# Patient Record
Sex: MALE | Race: WHITE | Employment: FULL TIME | URBAN - METROPOLITAN AREA
[De-identification: names, ages, dates, MRNs, and addresses within clinical notes are randomized per-mention and may not be internally consistent; named-entity substitution may affect disease eponyms.]

---

## 2019-06-12 ENCOUNTER — HOSPITAL ENCOUNTER (EMERGENCY)
Age: 49
Discharge: HOME | End: 2019-06-12
Attending: EMERGENCY MEDICINE
Payer: MEDICAID

## 2019-06-12 VITALS
BODY MASS INDEX: 23.17 KG/M2 | TEMPERATURE: 98.7 F | SYSTOLIC BLOOD PRESSURE: 147 MMHG | RESPIRATION RATE: 11 BRPM | WEIGHT: 171.08 LBS | HEART RATE: 91 BPM | DIASTOLIC BLOOD PRESSURE: 101 MMHG | OXYGEN SATURATION: 98 % | HEIGHT: 72 IN

## 2019-06-12 DIAGNOSIS — R03.0 ELEVATED BLOOD PRESSURE READING: ICD-10-CM

## 2019-06-12 DIAGNOSIS — E16.2 HYPOGLYCEMIA: ICD-10-CM

## 2019-06-12 DIAGNOSIS — E10.9 TYPE 1 DIABETES MELLITUS WITHOUT COMPLICATION (HCC): ICD-10-CM

## 2019-06-12 DIAGNOSIS — K08.89 DENTALGIA: ICD-10-CM

## 2019-06-12 DIAGNOSIS — G50.1 ATYPICAL FACE PAIN: Primary | ICD-10-CM

## 2019-06-12 LAB
GLUCOSE BLD STRIP.AUTO-MCNC: 43 MG/DL (ref 65–100)
GLUCOSE BLD STRIP.AUTO-MCNC: 63 MG/DL (ref 65–100)
GLUCOSE BLD STRIP.AUTO-MCNC: 94 MG/DL (ref 65–100)
SERVICE CMNT-IMP: ABNORMAL
SERVICE CMNT-IMP: ABNORMAL
SERVICE CMNT-IMP: NORMAL

## 2019-06-12 PROCEDURE — 99284 EMERGENCY DEPT VISIT MOD MDM: CPT

## 2019-06-12 PROCEDURE — 74011250637 HC RX REV CODE- 250/637: Performed by: PHYSICIAN ASSISTANT

## 2019-06-12 PROCEDURE — 82962 GLUCOSE BLOOD TEST: CPT

## 2019-06-12 RX ORDER — OXYCODONE AND ACETAMINOPHEN 5; 325 MG/1; MG/1
1 TABLET ORAL
Status: COMPLETED | OUTPATIENT
Start: 2019-06-12 | End: 2019-06-12

## 2019-06-12 RX ORDER — CLINDAMYCIN HYDROCHLORIDE 300 MG/1
300 CAPSULE ORAL 4 TIMES DAILY
Qty: 40 CAP | Refills: 0 | Status: SHIPPED | OUTPATIENT
Start: 2019-06-12 | End: 2019-06-22

## 2019-06-12 RX ORDER — CLINDAMYCIN HYDROCHLORIDE 150 MG/1
300 CAPSULE ORAL
Status: COMPLETED | OUTPATIENT
Start: 2019-06-12 | End: 2019-06-12

## 2019-06-12 RX ORDER — OXYCODONE AND ACETAMINOPHEN 5; 325 MG/1; MG/1
1 TABLET ORAL
Qty: 12 TAB | Refills: 0 | Status: SHIPPED | OUTPATIENT
Start: 2019-06-12 | End: 2019-06-17

## 2019-06-12 RX ADMIN — CLINDAMYCIN HYDROCHLORIDE 300 MG: 150 CAPSULE ORAL at 21:07

## 2019-06-12 RX ADMIN — OXYCODONE AND ACETAMINOPHEN 1 TABLET: 5; 325 TABLET ORAL at 21:07

## 2019-06-13 NOTE — ED PROVIDER NOTES
EMERGENCY DEPARTMENT HISTORY AND PHYSICAL EXAM      Date: 6/12/2019  Patient Name: Connie Young    History of Presenting Illness     Chief Complaint   Patient presents with    Dental Pain     Ambulatory into the ED with c/o lower dental pain/loose tooth x 1 week. History Provided By: Patient and Patient's Wife    HPI: Connie Young, 50 y.o. male presents ambulatory to the ED with his wife in attendance, c/o dental pain x 1 week. Pt states \"I only have two teeth left and is broke\". Pt c/o increased pain, swelling. Pt denied drainage or bleeding. No fever/chills. He denied facial swelling, difficulty swallowing/breathing. No rash/lesion. He is currently without a dentist/dental insurance. He is a type I diabetic and states he felt lightheaded/clammy like his blood sugar was dropping while in the ED. POC glucose assessed: very low. Pt states he has been eating/drinking well recently. No N/V/D. Pt reportedly quit tobacco 3 years ago. Chief Complaint: dental pain, hypoglycemia  Duration: 1 Weeks  Timing:  Acute  Location: L lower dentition/gingiva  Quality: Aching and Dull  Severity: Severe  Modifying Factors: type I diabetic  Associated Symptoms: denies any other associated signs or symptoms        There are no other complaints, changes, or physical findings at this time. PCP: None    Current Outpatient Medications   Medication Sig Dispense Refill    clindamycin (CLEOCIN) 300 mg capsule Take 1 Cap by mouth four (4) times daily for 10 days. 40 Cap 0    oxyCODONE-acetaminophen (PERCOCET) 5-325 mg per tablet Take 1 Tab by mouth every eight (8) hours as needed for Pain for up to 5 days. Max Daily Amount: 3 Tabs. 12 Tab 0       Past History     Past Medical History:  History reviewed. No pertinent past medical history. Past Surgical History:  History reviewed. No pertinent surgical history. Family History:  History reviewed. No pertinent family history.     Social History:  Social History Tobacco Use    Smoking status: Not on file   Substance Use Topics    Alcohol use: Not on file    Drug use: Not on file       Allergies:  No Known Allergies      Review of Systems   Review of Systems   Constitutional: Negative for chills and fever. HENT: Positive for dental problem. Negative for congestion, drooling, rhinorrhea, sore throat and trouble swallowing. Eyes: Negative for discharge, redness and itching. Respiratory: Negative for cough and shortness of breath. Cardiovascular: Negative for chest pain and palpitations. Gastrointestinal: Negative for diarrhea, nausea and vomiting. Endocrine: Positive for cold intolerance. Negative for polydipsia, polyphagia and polyuria. Genitourinary: Negative for dysuria and hematuria. Musculoskeletal: Negative for neck pain and neck stiffness. Skin: Negative for rash and wound. Allergic/Immunologic: Negative for food allergies and immunocompromised state. Neurological: Negative for dizziness and headaches. Hematological: Negative for adenopathy. Does not bruise/bleed easily. Psychiatric/Behavioral: Negative for agitation and confusion. Physical Exam   Physical Exam   Constitutional: He is oriented to person, place, and time. He appears well-developed and well-nourished. No distress. WDWN male, alert, in NAD   HENT:   Head: Normocephalic and atraumatic. Nose: Nose normal.   Mouth/Throat: No oropharyngeal exudate. Pt with poor overall dental health, multiple dental caries, decayed/missing teeth. Tender to L lower incisor which was notably decayed.  +gingival erythema, edema surrounding affected tooth, no fluctuance, exudate, or bleeding noted. Eyes: Pupils are equal, round, and reactive to light. Conjunctivae and EOM are normal. Right eye exhibits no discharge. Left eye exhibits no discharge. No scleral icterus. Neck: Normal range of motion. Neck supple. No JVD present. No tracheal deviation present. No thyromegaly present. Cardiovascular: Normal rate, regular rhythm and normal heart sounds. Pulmonary/Chest: Effort normal and breath sounds normal. No respiratory distress. He has no wheezes. Musculoskeletal: Normal range of motion. He exhibits no edema. Lymphadenopathy:     He has no cervical adenopathy. Neurological: He is alert and oriented to person, place, and time. He exhibits normal muscle tone. Coordination normal.   Skin: Skin is warm and dry. No rash noted. He is not diaphoretic. No erythema. Psychiatric: He has a normal mood and affect. His behavior is normal. Judgment normal.   Nursing note and vitals reviewed. Diagnostic Study Results     Labs -     Recent Results (from the past 12 hour(s))   GLUCOSE, POC    Collection Time: 06/12/19  8:27 PM   Result Value Ref Range    Glucose (POC) 43 (LL) 65 - 100 mg/dL    Performed by Babs Deshpande (EDT)    GLUCOSE, POC    Collection Time: 06/12/19  8:46 PM   Result Value Ref Range    Glucose (POC) 63 (L) 65 - 100 mg/dL    Performed by Sharif Wharton, POC    Collection Time: 06/12/19  9:04 PM   Result Value Ref Range    Glucose (POC) 94 65 - 100 mg/dL    Performed by Cecilia Ruiz        Radiologic Studies -   No orders to display         Medical Decision Making   I am the first provider for this patient. I reviewed the vital signs, available nursing notes, past medical history, past surgical history, family history and social history. Vital Signs-Reviewed the patient's vital signs. Patient Vitals for the past 12 hrs:   Temp Pulse Resp BP SpO2   06/12/19 1917 98.7 °F (37.1 °C) 91 11 (!) 147/101 98 %         Records Reviewed: Nursing Notes, Old Medical Records, Previous Radiology Studies and Previous Laboratory Studies    Provider Notes (Medical Decision Making):   Dental caries, dental abscess    ED Course:   Initial assessment performed.  The patients presenting problems have been discussed, and they are in agreement with the care plan formulated and outlined with them. I have encouraged them to ask questions as they arise throughout their visit. HTN COUNSELING  Reviewed the risks of uncontrolled HTN to include stroke, heart attack, renal failure, aneurysm and death. They will take their medications daily and follow up with their PCP for recheck. DISCHARGE NOTE:  The care plan has been outline with the patient and/or family, who verbally conveyed understanding and agreement. Available results have been reviewed. Patient and/or family understand the follow up plan as outlined and discharge instructions. Should their condition deterioration at any time after discharge the patient agrees to return, follow up sooner than outlined or seek medical assistance at the closest Emergency Room as soon as possible. Questions have been answered. Discharge instructions and educational information regarding the patient's diagnosis as well a list of reasons why the patient would want to seek immediate medical attention, should their condition change, were reviewed directly with the patient/family       PLAN:  1. Discharge Medication List as of 6/12/2019  8:59 PM      START taking these medications    Details   clindamycin (CLEOCIN) 300 mg capsule Take 1 Cap by mouth four (4) times daily for 10 days. , Print, Disp-40 Cap, R-0      oxyCODONE-acetaminophen (PERCOCET) 5-325 mg per tablet Take 1 Tab by mouth every eight (8) hours as needed for Pain for up to 5 days. Max Daily Amount: 3 Tabs., Print, Disp-12 Tab, R-0           2. Follow-up Information     Follow up With Specialties Details Why [address]    [address] [address]  [phone]    [facility] EMERGENCY DEPT Emergency Medicine  If symptoms worsen [address] [place] [address]  [phone]        Return to ED if worse     Diagnosis     Clinical Impression:   1. Atypical face pain    2. Dentalgia    3. Hypoglycemia    4.  Type 1 diabetes mellitus without complication (HCC)    5. Elevated blood pressure reading

## 2019-06-13 NOTE — DISCHARGE INSTRUCTIONS
Rest, watch sugar closely. Complete all antibiotics as prescribed and follow up with dentist at your earliest opportunity. Follow up with primary care for recheck of blood pressure. Return to the Emergency Dept for any worsening pain, redness, swelling, drainage,fever, or difficulty swallowing/breathing. Patient Education        Elevated Blood Pressure: Care Instructions  Your Care Instructions    Blood pressure is a measure of how hard the blood pushes against the walls of your arteries. It's normal for blood pressure to go up and down throughout the day. But if it stays up over time, you have high blood pressure. Two numbers tell you your blood pressure. The first number is the systolic pressure. It shows how hard the blood pushes when your heart is pumping. The second number is the diastolic pressure. It shows how hard the blood pushes between heartbeats, when your heart is relaxed and filling with blood. An ideal blood pressure in adults is less than 120/80 (say \"120 over 80\"). High blood pressure is 140/90 or higher. You have high blood pressure if your top number is 140 or higher or your bottom number is 90 or higher, or both. The main test for high blood pressure is simple, fast, and painless. To diagnose high blood pressure, your doctor will test your blood pressure at different times. After testing your blood pressure, your doctor may ask you to test it again when you are home. If you are diagnosed with high blood pressure, you can work with your doctor to make a long-term plan to manage it. Follow-up care is a key part of your treatment and safety. Be sure to make and go to all appointments, and call your doctor if you are having problems. It's also a good idea to know your test results and keep a list of the medicines you take. How can you care for yourself at home? · Do not smoke. Smoking increases your risk for heart attack and stroke.  If you need help quitting, talk to your doctor about stop-smoking programs and medicines. These can increase your chances of quitting for good. · Stay at a healthy weight. · Try to limit how much sodium you eat to less than 2,300 milligrams (mg) a day. Your doctor may ask you to try to eat less than 1,500 mg a day. · Be physically active. Get at least 30 minutes of exercise on most days of the week. Walking is a good choice. You also may want to do other activities, such as running, swimming, cycling, or playing tennis or team sports. · Avoid or limit alcohol. Talk to your doctor about whether you can drink any alcohol. · Eat plenty of fruits, vegetables, and low-fat dairy products. Eat less saturated and total fats. · Learn how to check your blood pressure at home. When should you call for help? Call your doctor now or seek immediate medical care if:  ? · Your blood pressure is much higher than normal (such as 180/110 or higher). ? · You think high blood pressure is causing symptoms such as:  ¨ Severe headache. ¨ Blurry vision. ? Watch closely for changes in your health, and be sure to contact your doctor if:  ? · You do not get better as expected. Where can you learn more? Go to http://leticia-zurdo.info/. Enter X988 in the search box to learn more about \"Elevated Blood Pressure: Care Instructions. \"  Current as of: September 21, 2016  Content Version: 11.4  © 8863-6756 [employer]. Care instructions adapted under license by [employer] (which disclaims liability or warranty for this information). If you have questions about a medical condition or this instruction, always ask your healthcare professional. [place] [address] any warranty or liability for your use of this information.

## 2019-06-13 NOTE — ED NOTES
HYPOGLYCEMIC EPISODE DOCUMENTATION    Patient with hypoglycemic episode(s) at 2027(time) on 6/12/19(date). BG value(s) pre-treatment 37    Was patient symptomatic?  [x] yes, [] no  Patient was treated with the following rescue medications/treatments: [] D50                [] Glucose tablets                [] Glucagon                [x] 4oz juice                [] 6oz reg soda                [] 8oz low fat milk  BG value post-treatment: 63  Once BG treated and value greater than 80mg/dl, pt was provided with the following:  [] snack  [x] meal  Name of MD notified: SHAILA Diallo  The following orders were received: none

## 2019-06-16 ENCOUNTER — HOSPITAL ENCOUNTER (EMERGENCY)
Age: 49
Discharge: HOME | End: 2019-06-17
Attending: EMERGENCY MEDICINE
Payer: MEDICAID

## 2019-06-16 ENCOUNTER — APPOINTMENT (OUTPATIENT)
Dept: GENERAL RADIOLOGY | Age: 49
End: 2019-06-16
Attending: EMERGENCY MEDICINE
Payer: MEDICAID

## 2019-06-16 DIAGNOSIS — K08.89 DENTALGIA: ICD-10-CM

## 2019-06-16 DIAGNOSIS — R10.13 ABDOMINAL PAIN, EPIGASTRIC: Primary | ICD-10-CM

## 2019-06-16 DIAGNOSIS — K82.9 GALLBLADDER DISEASE: ICD-10-CM

## 2019-06-16 LAB
ALBUMIN SERPL-MCNC: 3.5 G/DL (ref 3.5–5)
ALBUMIN/GLOB SERPL: 0.9 {RATIO} (ref 1.1–2.2)
ALP SERPL-CCNC: 104 U/L (ref 45–117)
ALT SERPL-CCNC: 23 U/L (ref 12–78)
ANION GAP SERPL CALC-SCNC: 4 MMOL/L (ref 5–15)
AST SERPL-CCNC: 16 U/L (ref 15–37)
BASOPHILS # BLD: 0.1 K/UL (ref 0–0.1)
BASOPHILS NFR BLD: 1 % (ref 0–1)
BILIRUB SERPL-MCNC: 0.2 MG/DL (ref 0.2–1)
BUN SERPL-MCNC: 19 MG/DL (ref 6–20)
BUN/CREAT SERPL: 18 (ref 12–20)
CALCIUM SERPL-MCNC: 9 MG/DL (ref 8.5–10.1)
CHLORIDE SERPL-SCNC: 106 MMOL/L (ref 97–108)
CK SERPL-CCNC: 110 U/L (ref 39–308)
CO2 SERPL-SCNC: 31 MMOL/L (ref 21–32)
CREAT SERPL-MCNC: 1.04 MG/DL (ref 0.7–1.3)
DIFFERENTIAL METHOD BLD: NORMAL
EOSINOPHIL # BLD: 0.3 K/UL (ref 0–0.4)
EOSINOPHIL NFR BLD: 4 % (ref 0–7)
ERYTHROCYTE [DISTWIDTH] IN BLOOD BY AUTOMATED COUNT: 13.2 % (ref 11.5–14.5)
GLOBULIN SER CALC-MCNC: 3.7 G/DL (ref 2–4)
GLUCOSE SERPL-MCNC: 115 MG/DL (ref 65–100)
HCT VFR BLD AUTO: 37.7 % (ref 36.6–50.3)
HGB BLD-MCNC: 12.9 G/DL (ref 12.1–17)
IMM GRANULOCYTES # BLD AUTO: 0 K/UL (ref 0–0.04)
IMM GRANULOCYTES NFR BLD AUTO: 0 % (ref 0–0.5)
LYMPHOCYTES # BLD: 2.3 K/UL (ref 0.8–3.5)
LYMPHOCYTES NFR BLD: 26 % (ref 12–49)
MCH RBC QN AUTO: 27.4 PG (ref 26–34)
MCHC RBC AUTO-ENTMCNC: 34.2 G/DL (ref 30–36.5)
MCV RBC AUTO: 80 FL (ref 80–99)
MONOCYTES # BLD: 0.6 K/UL (ref 0–1)
MONOCYTES NFR BLD: 7 % (ref 5–13)
NEUTS SEG # BLD: 5.4 K/UL (ref 1.8–8)
NEUTS SEG NFR BLD: 62 % (ref 32–75)
NRBC # BLD: 0 K/UL (ref 0–0.01)
NRBC BLD-RTO: 0 PER 100 WBC
PLATELET # BLD AUTO: 245 K/UL (ref 150–400)
PMV BLD AUTO: 9.1 FL (ref 8.9–12.9)
POTASSIUM SERPL-SCNC: 3.7 MMOL/L (ref 3.5–5.1)
PROT SERPL-MCNC: 7.2 G/DL (ref 6.4–8.2)
RBC # BLD AUTO: 4.71 M/UL (ref 4.1–5.7)
SODIUM SERPL-SCNC: 141 MMOL/L (ref 136–145)
TROPONIN I SERPL-MCNC: <0.05 NG/ML
WBC # BLD AUTO: 8.7 K/UL (ref 4.1–11.1)

## 2019-06-16 PROCEDURE — 84484 ASSAY OF TROPONIN QUANT: CPT

## 2019-06-16 PROCEDURE — 71045 X-RAY EXAM CHEST 1 VIEW: CPT

## 2019-06-16 PROCEDURE — 82550 ASSAY OF CK (CPK): CPT

## 2019-06-16 PROCEDURE — 74011250636 HC RX REV CODE- 250/636: Performed by: EMERGENCY MEDICINE

## 2019-06-16 PROCEDURE — 36415 COLL VENOUS BLD VENIPUNCTURE: CPT

## 2019-06-16 PROCEDURE — 80053 COMPREHEN METABOLIC PANEL: CPT

## 2019-06-16 PROCEDURE — 93005 ELECTROCARDIOGRAM TRACING: CPT

## 2019-06-16 PROCEDURE — 83690 ASSAY OF LIPASE: CPT

## 2019-06-16 PROCEDURE — 99284 EMERGENCY DEPT VISIT MOD MDM: CPT

## 2019-06-16 PROCEDURE — 85025 COMPLETE CBC W/AUTO DIFF WBC: CPT

## 2019-06-16 RX ORDER — FENTANYL CITRATE 50 UG/ML
50 INJECTION, SOLUTION INTRAMUSCULAR; INTRAVENOUS
Status: COMPLETED | OUTPATIENT
Start: 2019-06-16 | End: 2019-06-17

## 2019-06-16 RX ORDER — FAMOTIDINE 10 MG/ML
20 INJECTION INTRAVENOUS
Status: DISCONTINUED | OUTPATIENT
Start: 2019-06-16 | End: 2019-06-16

## 2019-06-16 RX ORDER — ONDANSETRON 2 MG/ML
4 INJECTION INTRAMUSCULAR; INTRAVENOUS
Status: COMPLETED | OUTPATIENT
Start: 2019-06-16 | End: 2019-06-17

## 2019-06-17 ENCOUNTER — APPOINTMENT (OUTPATIENT)
Dept: ULTRASOUND IMAGING | Age: 49
End: 2019-06-17
Attending: EMERGENCY MEDICINE
Payer: MEDICAID

## 2019-06-17 VITALS
TEMPERATURE: 98.2 F | SYSTOLIC BLOOD PRESSURE: 132 MMHG | HEIGHT: 72 IN | HEART RATE: 71 BPM | BODY MASS INDEX: 23.71 KG/M2 | OXYGEN SATURATION: 98 % | WEIGHT: 175.04 LBS | RESPIRATION RATE: 17 BRPM | DIASTOLIC BLOOD PRESSURE: 99 MMHG

## 2019-06-17 LAB
ATRIAL RATE: 86 BPM
CALCULATED P AXIS, ECG09: 43 DEGREES
CALCULATED R AXIS, ECG10: -22 DEGREES
CALCULATED T AXIS, ECG11: 35 DEGREES
DIAGNOSIS, 93000: NORMAL
LACTATE SERPL-SCNC: 0.7 MMOL/L (ref 0.4–2)
LIPASE SERPL-CCNC: 55 U/L (ref 73–393)
P-R INTERVAL, ECG05: 178 MS
Q-T INTERVAL, ECG07: 370 MS
QRS DURATION, ECG06: 102 MS
QTC CALCULATION (BEZET), ECG08: 442 MS
VENTRICULAR RATE, ECG03: 86 BPM

## 2019-06-17 PROCEDURE — 76705 ECHO EXAM OF ABDOMEN: CPT

## 2019-06-17 PROCEDURE — 74011250637 HC RX REV CODE- 250/637: Performed by: EMERGENCY MEDICINE

## 2019-06-17 PROCEDURE — 96361 HYDRATE IV INFUSION ADD-ON: CPT

## 2019-06-17 PROCEDURE — 96376 TX/PRO/DX INJ SAME DRUG ADON: CPT

## 2019-06-17 PROCEDURE — 96374 THER/PROPH/DIAG INJ IV PUSH: CPT

## 2019-06-17 PROCEDURE — 74011000250 HC RX REV CODE- 250: Performed by: EMERGENCY MEDICINE

## 2019-06-17 PROCEDURE — 74011250636 HC RX REV CODE- 250/636

## 2019-06-17 PROCEDURE — 74011250636 HC RX REV CODE- 250/636: Performed by: EMERGENCY MEDICINE

## 2019-06-17 PROCEDURE — 83605 ASSAY OF LACTIC ACID: CPT

## 2019-06-17 PROCEDURE — 96375 TX/PRO/DX INJ NEW DRUG ADDON: CPT

## 2019-06-17 RX ORDER — FENTANYL CITRATE 50 UG/ML
25 INJECTION, SOLUTION INTRAMUSCULAR; INTRAVENOUS
Status: COMPLETED | OUTPATIENT
Start: 2019-06-17 | End: 2019-06-17

## 2019-06-17 RX ORDER — OXYCODONE AND ACETAMINOPHEN 5; 325 MG/1; MG/1
1 TABLET ORAL
Qty: 10 TAB | Refills: 0 | Status: SHIPPED | OUTPATIENT
Start: 2019-06-17 | End: 2019-06-20

## 2019-06-17 RX ORDER — FENTANYL CITRATE 50 UG/ML
INJECTION, SOLUTION INTRAMUSCULAR; INTRAVENOUS
Status: DISCONTINUED
Start: 2019-06-17 | End: 2019-06-17 | Stop reason: HOSPADM

## 2019-06-17 RX ORDER — ONDANSETRON 4 MG/1
4 TABLET, ORALLY DISINTEGRATING ORAL
Qty: 10 TAB | Refills: 0 | Status: SHIPPED | OUTPATIENT
Start: 2019-06-17

## 2019-06-17 RX ADMIN — LIDOCAINE HYDROCHLORIDE 40 ML: 20 SOLUTION ORAL; TOPICAL at 00:31

## 2019-06-17 RX ADMIN — FENTANYL CITRATE 25 MCG: 50 INJECTION, SOLUTION INTRAMUSCULAR; INTRAVENOUS at 01:54

## 2019-06-17 RX ADMIN — FENTANYL CITRATE 50 MCG: 50 INJECTION, SOLUTION INTRAMUSCULAR; INTRAVENOUS at 00:02

## 2019-06-17 RX ADMIN — ONDANSETRON 4 MG: 2 INJECTION INTRAMUSCULAR; INTRAVENOUS at 00:03

## 2019-06-17 RX ADMIN — FAMOTIDINE 20 MG: 10 INJECTION, SOLUTION INTRAVENOUS at 00:03

## 2019-06-17 RX ADMIN — SODIUM CHLORIDE 1000 ML: 900 INJECTION, SOLUTION INTRAVENOUS at 00:02

## 2019-06-17 NOTE — ED PROVIDER NOTES
EMERGENCY DEPARTMENT HISTORY AND PHYSICAL EXAM      Date: 6/16/2019  Patient Name: Vita Pereyra    History of Presenting Illness     Chief Complaint   Patient presents with    Chest Pain     Since midnight last night. Pt states it feels like horrible heart burn. Pt denies any cardiac hx.  reports vomiting yesterday. Pt ambulatory to triage.  Back Pain     started shortly after CP. Hx of fx to same area. History Provided By: Patient    HPI: Vita Pereyra, 50 y.o. male presents to the emergency room with chief complaint of epigastric pain for the past 24 hours. Patient states the pain is severe and burning and nonradiating. He thought it was heartburn and tried Prilosec, Rolaids, and Tums without relief. He had nausea and vomiting x1 that did not have any blood in it. He has had 6 episodes of diarrhea that was nonblack and nonbloody. He reports chills but no fevers. He was recently treated with clindamycin and Percocet for dental infection and has been on antibiotics for the past 4 days. He is also been taking ibuprofen. He denies alcohol use. He is a type I diabetic. PCP: None    No current facility-administered medications on file prior to encounter. Current Outpatient Medications on File Prior to Encounter   Medication Sig Dispense Refill    clindamycin (CLEOCIN) 300 mg capsule Take 1 Cap by mouth four (4) times daily for 10 days. 40 Cap 0    oxyCODONE-acetaminophen (PERCOCET) 5-325 mg per tablet Take 1 Tab by mouth every eight (8) hours as needed for Pain for up to 5 days. Max Daily Amount: 3 Tabs. 12 Tab 0       Past History     Past Medical History:  No past medical history on file. Past Surgical History:  No past surgical history on file. Family History:  No family history on file.     Social History:  Social History     Tobacco Use    Smoking status: Not on file   Substance Use Topics    Alcohol use: Not on file    Drug use: Not on file       Allergies:  No Known Allergies      Review of Systems   Review of Systems   Constitutional: Negative for chills and fever. HENT: Negative for congestion, ear pain, sinus pain and sore throat. Eyes: Negative. Respiratory: Negative for cough, chest tightness and shortness of breath. Cardiovascular: Negative for chest pain, palpitations and leg swelling. Gastrointestinal: Positive for abdominal pain, diarrhea, nausea and vomiting. Negative for blood in stool and constipation. Genitourinary: Negative for decreased urine volume, dysuria, flank pain and hematuria. Musculoskeletal: Negative for back pain, joint swelling, myalgias and neck pain. Skin: Negative for rash. Neurological: Negative for syncope, speech difficulty, weakness and light-headedness. Psychiatric/Behavioral: The patient is not nervous/anxious.           Physical Exam    General appearance - well nourished, well appearing, and in no distress  Eyes - pupils equal and reactive, extraocular eye movements intact  ENT - mucous membranes moist, pharynx normal without lesions  Neck - supple, no significant adenopathy; non-tender to palpation  Chest - clear to auscultation, no wheezes, rales or rhonchi; non-tender to palpation  Heart - normal rate and regular rhythm, S1 and S2 normal, no murmurs noted  Abdomen - soft, epigastric tenderness, no rebound or guarding, nondistended, no masses or organomegaly  Musculoskeletal - no joint tenderness, deformity or swelling; normal ROM  Extremities - peripheral pulses normal, no pedal edema  Skin - normal coloration and turgor, no rashes  Neurological - alert, oriented x3, normal speech, no focal findings or movement disorder noted    Diagnostic Study Results     Labs -     Recent Results (from the past 12 hour(s))   EKG, 12 LEAD, INITIAL    Collection Time: 06/16/19 10:34 PM   Result Value Ref Range    Ventricular Rate 86 BPM    Atrial Rate 86 BPM    P-R Interval 178 ms    QRS Duration 102 ms    Q-T Interval 370 ms QTC Calculation (Bezet) 442 ms    Calculated P Axis 43 degrees    Calculated R Axis -22 degrees    Calculated T Axis 35 degrees    Diagnosis       Normal sinus rhythm  Normal ECG  No previous ECGs available     CBC WITH AUTOMATED DIFF    Collection Time: 06/16/19 10:56 PM   Result Value Ref Range    WBC 8.7 4.1 - 11.1 K/uL    RBC 4.71 4.10 - 5.70 M/uL    HGB 12.9 12.1 - 17.0 g/dL    HCT 37.7 36.6 - 50.3 %    MCV 80.0 80.0 - 99.0 FL    MCH 27.4 26.0 - 34.0 PG    MCHC 34.2 30.0 - 36.5 g/dL    RDW 13.2 11.5 - 14.5 %    PLATELET 983 545 - 607 K/uL    MPV 9.1 8.9 - 12.9 FL    NRBC 0.0 0  WBC    ABSOLUTE NRBC 0.00 0.00 - 0.01 K/uL    NEUTROPHILS 62 32 - 75 %    LYMPHOCYTES 26 12 - 49 %    MONOCYTES 7 5 - 13 %    EOSINOPHILS 4 0 - 7 %    BASOPHILS 1 0 - 1 %    IMMATURE GRANULOCYTES 0 0.0 - 0.5 %    ABS. NEUTROPHILS 5.4 1.8 - 8.0 K/UL    ABS. LYMPHOCYTES 2.3 0.8 - 3.5 K/UL    ABS. MONOCYTES 0.6 0.0 - 1.0 K/UL    ABS. EOSINOPHILS 0.3 0.0 - 0.4 K/UL    ABS. BASOPHILS 0.1 0.0 - 0.1 K/UL    ABS. IMM. GRANS. 0.0 0.00 - 0.04 K/UL    DF AUTOMATED     METABOLIC PANEL, COMPREHENSIVE    Collection Time: 06/16/19 10:56 PM   Result Value Ref Range    Sodium 141 136 - 145 mmol/L    Potassium 3.7 3.5 - 5.1 mmol/L    Chloride 106 97 - 108 mmol/L    CO2 31 21 - 32 mmol/L    Anion gap 4 (L) 5 - 15 mmol/L    Glucose 115 (H) 65 - 100 mg/dL    BUN 19 6 - 20 MG/DL    Creatinine 1.04 0.70 - 1.30 MG/DL    BUN/Creatinine ratio 18 12 - 20      GFR est AA >60 >60 ml/min/1.73m2    GFR est non-AA >60 >60 ml/min/1.73m2    Calcium 9.0 8.5 - 10.1 MG/DL    Bilirubin, total 0.2 0.2 - 1.0 MG/DL    ALT (SGPT) 23 12 - 78 U/L    AST (SGOT) 16 15 - 37 U/L    Alk.  phosphatase 104 45 - 117 U/L    Protein, total 7.2 6.4 - 8.2 g/dL    Albumin 3.5 3.5 - 5.0 g/dL    Globulin 3.7 2.0 - 4.0 g/dL    A-G Ratio 0.9 (L) 1.1 - 2.2     CK W/ REFLX CKMB    Collection Time: 06/16/19 10:56 PM   Result Value Ref Range     39 - 308 U/L   TROPONIN I    Collection Time: 06/16/19 10:56 PM   Result Value Ref Range    Troponin-I, Qt. <0.05 <0.05 ng/mL       Radiologic Studies -   XR CHEST PORT   Final Result   impression: No acute changes. CT Results  (Last 48 hours)    None        CXR Results  (Last 48 hours)               06/16/19 2245  XR CHEST PORT Final result    Impression:  impression: No acute changes. Narrative:  Clinical indication: Chest pain. Portable AP upright view of the chest is obtained. No prior. There is elevation   of the left hemidiaphragm. The heart size is normal. There is no acute   infiltrate. Medical Decision Making   I am the first provider for this patient. I reviewed the vital signs, available nursing notes, past medical history, past surgical history, family history and social history. Vital Signs-Reviewed the patient's vital signs. Patient Vitals for the past 12 hrs:   Temp Pulse Resp BP SpO2   06/16/19 2330 -- 77 16 149/79 98 %   06/16/19 2300 -- 81 15 121/86 97 %   06/16/19 2228 98.2 °F (36.8 °C) 84 18 128/74 100 %       EKG: Normal sinus rhythm, 86 bpm, normal axis, normal NE, QRS, QTc intervals, nonspecific ST changes    Records Reviewed: Nursing Notes and Old Medical Records    Provider Notes (Medical Decision Making):   Differential diagnosis: Gastritis, pancreatitis, cholecystitis, acute coronary syndrome, GERD  ED Course:   Initial assessment performed. The patients presenting problems have been discussed, and they are in agreement with the care plan formulated and outlined with them. I have encouraged them to ask questions as they arise throughout their visit. Progress Notes:     Patient feeling better in the emergency room. Tolerating p.o. Disposition:  ME home    PLAN:  1. Discharge Medication List as of 6/17/2019  3:52 AM      START taking these medications    Details   ondansetron (ZOFRAN ODT) 4 mg disintegrating tablet Take 1 Tab by mouth every eight (8) hours as needed for Nausea. , Print, Disp-10 Tab, R-0         CONTINUE these medications which have CHANGED    Details   oxyCODONE-acetaminophen (PERCOCET) 5-325 mg per tablet Take 1 Tab by mouth every eight (8) hours as needed for Pain for up to 3 days. Max Daily Amount: 3 Tabs., Print, Disp-10 Tab, R-0         CONTINUE these medications which have NOT CHANGED    Details   clindamycin (CLEOCIN) 300 mg capsule Take 1 Cap by mouth four (4) times daily for 10 days. , Print, Disp-40 Cap, R-0           2. Follow-up Information     Follow up With Specialties Details Why Contact Info    [facility] EMERGENCY DEPT Emergency Medicine  If symptoms worsen [address] Shlomo Ramsey MD General Surgery, Breast Surgery, Colon and Rectal Surgery, Endocrinology Schedule an appointment as soon as possible for a visit  [address] [place] [address] [address]  [phone]          Return to ED if worse     Diagnosis     Clinical Impression:   1. Abdominal pain, epigastric    2. Gallbladder disease    3.  Ranjoellee Cogan

## 2019-06-17 NOTE — ED NOTES
Assumed care of patient from triage. Patient endorses indigestion/heartburn chest discomfort beginning around midnight last night. Patient states 1 episode of vomiting in the evening before this. Patient endorses associated SOB. Patient a/o x 4.

## 2019-06-17 NOTE — ED NOTES
Patient discharged by Dr. Victor Hugo Montgomery. Patient ambulated with steady gait in NAD accompanied by spouse on departure.

## 2019-06-17 NOTE — DISCHARGE INSTRUCTIONS
Patient Education        Abdominal Pain: Care Instructions  Your Care Instructions    Abdominal pain has many possible causes. Some aren't serious and get better on their own in a few days. Others need more testing and treatment. If your pain continues or gets worse, you need to be rechecked and may need more tests to find out what is wrong. You may need surgery to correct the problem. Don't ignore new symptoms, such as fever, nausea and vomiting, urination problems, pain that gets worse, and dizziness. These may be signs of a more serious problem. Your doctor may have recommended a follow-up visit in the next 8 to 12 hours. If you are not getting better, you may need more tests or treatment. The doctor has checked you carefully, but problems can develop later. If you notice any problems or new symptoms, get medical treatment right away. Follow-up care is a key part of your treatment and safety. Be sure to make and go to all appointments, and call your doctor if you are having problems. It's also a good idea to know your test results and keep a list of the medicines you take. How can you care for yourself at home? · Rest until you feel better. · To prevent dehydration, drink plenty of fluids, enough so that your urine is light yellow or clear like water. Choose water and other caffeine-free clear liquids until you feel better. If you have kidney, heart, or liver disease and have to limit fluids, talk with your doctor before you increase the amount of fluids you drink. · If your stomach is upset, eat mild foods, such as rice, dry toast or crackers, bananas, and applesauce. Try eating several small meals instead of two or three large ones. · Wait until 48 hours after all symptoms have gone away before you have spicy foods, alcohol, and drinks that contain caffeine. · Do not eat foods that are high in fat. · Avoid anti-inflammatory medicines such as aspirin, ibuprofen (Advil, Motrin), and naproxen (Aleve). These can cause stomach upset. Talk to your doctor if you take daily aspirin for another health problem. When should you call for help? Call 911 anytime you think you may need emergency care. For example, call if:    · You passed out (lost consciousness).     · You pass maroon or very bloody stools.     · You vomit blood or what looks like coffee grounds.     · You have new, severe belly pain.    Call your doctor now or seek immediate medical care if:    · Your pain gets worse, especially if it becomes focused in one area of your belly.     · You have a new or higher fever.     · Your stools are black and look like tar, or they have streaks of blood.     · You have unexpected vaginal bleeding.     · You have symptoms of a urinary tract infection. These may include:  ? Pain when you urinate. ? Urinating more often than usual.  ? Blood in your urine.     · You are dizzy or lightheaded, or you feel like you may faint.    Watch closely for changes in your health, and be sure to contact your doctor if:    · You are not getting better after 1 day (24 hours). Where can you learn more? Go to http://leticiaMeal Mantrazurdo.info/. Enter N223 in the search box to learn more about \"Abdominal Pain: Care Instructions. \"  Current as of: September 23, 2018  Content Version: 11.9  © 6502-7807 [employer] Care instructions adapted under license by [employer] (which disclaims liability or warranty for this information). If you have questions about a medical condition or this instruction, always ask your healthcare professional. [place] [address] any warranty or liability for your use of this information. Patient Education        Low-Fat Diet for Gallbladder Disease: Care Instructions  Your Care Instructions    When you eat, the gallbladder releases bile, which helps you digest the fat in food. If you have an inflamed gallbladder, this may cause pain.  A low-fat diet may give your gallbladder a rest so you can start to heal. Your doctor and dietitian can help you make an eating plan that does not irritate your digestive system. Always talk with your doctor or dietitian before you make changes in your diet. Follow-up care is a key part of your treatment and safety. Be sure to make and go to all appointments, and call your doctor if you are having problems. It's also a good idea to know your test results and keep a list of the medicines you take. How can you care for yourself at home? · Eat many small meals and snacks each day instead of three large meals. · Choose lean meats. ? Eat no more than 5 to 6½ ounces of meat a day. ? Cut off all fat you can see. ? Eat chicken and turkey without the skin. ? Many types of fish, such as salmon, lake trout, tuna, and herring, provide healthy omega-3 fat. But, avoid fish canned in oil, such as sardines in olive oil. ? Bake, broil, or grill meats, poultry, or fish instead of frying them in butter or fat. · Drink or eat nonfat or low-fat milk, yogurt, cheese, or other milk products each day. ? Read the labels on cheeses, and choose those with less than 5 grams of fat an ounce. ? Try fat-free sour cream, cream cheese, or yogurt. ? Avoid cream soups and cream sauces on pasta. ? Eat low-fat ice cream, frozen yogurt, or sorbet. Avoid regular ice cream.  · Eat whole-grain cereals, breads, crackers, rice, or pasta. Avoid high-fat foods such as croissants, scones, biscuits, waffles, doughnuts, muffins, granola, and high-fat breads. · Flavor your foods with herbs and spices (such as basil, tarragon, or mint), fat-free sauces, or lemon juice instead of butter. You can also use butter substitutes, fat-free mayonnaise, or fat-free dressing. · Try applesauce, prune puree, or mashed bananas to replace some or all of the fat when you bake.   · Limit fats and oils, such as butter, margarine, mayonnaise, and salad dressing, to no more than 1 tablespoon a meal.  · Avoid high-fat foods, such as:  ? Chocolate, whole milk, ice cream, and processed cheese. ? Fried or buttered foods. ? Sausage, salami, and gil. ? Cinnamon rolls, cakes, pies, cookies, and other pastries. ? Prepared snack foods, such as potato chips, nut and granola bars, and mixed nuts. ? Coconut and avocado. · Learn how to read food labels for serving sizes and ingredients. Fast-food and convenience-food meals often have lots of fat. Where can you learn more? Go to http://leticia-zurdo.info/. Enter P783 in the search box to learn more about \"Low-Fat Diet for Gallbladder Disease: Care Instructions. \"  Current as of: March 28, 2018  Content Version: 11.9  © 1229-6025 [employer]. Care instructions adapted under license by [employer] (which disclaims liability or warranty for this information). If you have questions about a medical condition or this instruction, always ask your healthcare professional. [place] [address] any warranty or liability for your use of this information.

## 2019-08-25 ENCOUNTER — HOSPITAL ENCOUNTER (EMERGENCY)
Age: 49
Discharge: HOME | End: 2019-08-25
Attending: EMERGENCY MEDICINE
Payer: SELF-PAY

## 2019-08-25 ENCOUNTER — APPOINTMENT (OUTPATIENT)
Dept: GENERAL RADIOLOGY | Age: 49
End: 2019-08-25
Attending: PHYSICIAN ASSISTANT
Payer: SELF-PAY

## 2019-08-25 VITALS
RESPIRATION RATE: 17 BRPM | OXYGEN SATURATION: 99 % | HEIGHT: 72 IN | HEART RATE: 99 BPM | SYSTOLIC BLOOD PRESSURE: 143 MMHG | DIASTOLIC BLOOD PRESSURE: 92 MMHG | BODY MASS INDEX: 22.46 KG/M2 | WEIGHT: 165.79 LBS | TEMPERATURE: 97.9 F

## 2019-08-25 DIAGNOSIS — M79.604 RIGHT LEG PAIN: Primary | ICD-10-CM

## 2019-08-25 LAB — D DIMER PPP FEU-MCNC: 0.31 MG/L FEU (ref 0–0.65)

## 2019-08-25 PROCEDURE — 74011250637 HC RX REV CODE- 250/637: Performed by: PHYSICIAN ASSISTANT

## 2019-08-25 PROCEDURE — 99283 EMERGENCY DEPT VISIT LOW MDM: CPT

## 2019-08-25 PROCEDURE — 36415 COLL VENOUS BLD VENIPUNCTURE: CPT

## 2019-08-25 PROCEDURE — 85379 FIBRIN DEGRADATION QUANT: CPT

## 2019-08-25 PROCEDURE — 73590 X-RAY EXAM OF LOWER LEG: CPT

## 2019-08-25 RX ORDER — IBUPROFEN 600 MG/1
600 TABLET ORAL
Status: COMPLETED | OUTPATIENT
Start: 2019-08-25 | End: 2019-08-25

## 2019-08-25 RX ORDER — IBUPROFEN 600 MG/1
600 TABLET ORAL
Qty: 20 TAB | Refills: 0 | OUTPATIENT
Start: 2019-08-25 | End: 2019-10-12

## 2019-08-25 RX ADMIN — IBUPROFEN 600 MG: 600 TABLET ORAL at 14:36

## 2019-08-25 NOTE — ED PROVIDER NOTES
EMERGENCY DEPARTMENT HISTORY AND PHYSICAL EXAM      Date: 8/25/2019  Patient Name: Stephane Goldmann    History of Presenting Illness     Chief Complaint   Patient presents with    Leg Pain     right leg pain x couple days. reports getting new shoes recently. \"its like  tree is splitting it feels like\"       History Provided By: Patient    HPI: Stephane Goldmann, 52 y.o. male presents ambulatory to the ED with cc of 2 days of 10 out of 10 constant, aching right lower leg pain that is worse with palpation and movement and not associated with injury or shortness of breath. He does have IDDM and a history of diabetic neuropathy. He tells me he recently moved from [place] to [place] and is anticipating getting his insurance set up. He works as an  and recently started wearing new steel toed boots. There are no other complaints, changes, or physical findings at this time. PCP: None    Current Outpatient Medications   Medication Sig Dispense Refill    ibuprofen (MOTRIN) 600 mg tablet Take 1 Tab by mouth every eight (8) hours as needed for Pain. 20 Tab 0    ondansetron (ZOFRAN ODT) 4 mg disintegrating tablet Take 1 Tab by mouth every eight (8) hours as needed for Nausea. 10 Tab 0     Past History     Past Medical History:  Past Medical History:   Diagnosis Date    Diabetes ([address]     Neuropathy        Past Surgical History:  History reviewed. No pertinent surgical history. Family History:  History reviewed. No pertinent family history. Social History:  Social History     Tobacco Use    Smoking status: Not on file   Substance Use Topics    Alcohol use: Not on file    Drug use: Not on file       Allergies:  No Known Allergies  Review of Systems   Review of Systems   Constitutional: Negative for fatigue and fever. HENT: Negative for congestion, ear pain and rhinorrhea. Eyes: Negative for pain and redness. Respiratory: Negative for cough and wheezing.     Cardiovascular: Negative for chest pain and palpitations. Gastrointestinal: Negative for abdominal pain, nausea and vomiting. Genitourinary: Negative for dysuria, frequency and urgency. Musculoskeletal: Negative for back pain, neck pain and neck stiffness. Right lower leg pain   Skin: Negative for rash and wound. Neurological: Negative for weakness, light-headedness, numbness and headaches. Physical Exam   Physical Exam   Constitutional: He is oriented to person, place, and time. He appears well-developed and well-nourished. Non-toxic appearance. No distress. HENT:   Head: Normocephalic and atraumatic. Head is without right periorbital erythema and without left periorbital erythema. Right Ear: External ear normal.   Left Ear: External ear normal.   Nose: Nose normal.   Mouth/Throat: Uvula is midline. No trismus in the jaw. Eyes: Pupils are equal, round, and reactive to light. Conjunctivae and EOM are normal. No scleral icterus. Neck: Normal range of motion and full passive range of motion without pain. Cardiovascular: Normal rate, regular rhythm and normal heart sounds. Pulmonary/Chest: Effort normal and breath sounds normal. No accessory muscle usage. No tachypnea. No respiratory distress. He has no decreased breath sounds. He has no wheezes. Abdominal: Soft. There is no tenderness. There is no rigidity and no guarding. Musculoskeletal: Normal range of motion. Right lower leg: He exhibits tenderness and bony tenderness. RIGHT LOWER LEG:  No bruising, redness or obvious swelling  FAROM of all joints of the lower extremity (hip, knee, ankle, foot)  There is calf tenderness and mild medial midshaft tibial tenderness  There are no cordlike palpable varicosities   Neurological: He is alert and oriented to person, place, and time. He is not disoriented. No cranial nerve deficit or sensory deficit. GCS eye subscore is 4. GCS verbal subscore is 5. GCS motor subscore is 6. Skin: Skin is intact. No rash noted. Psychiatric: He has a normal mood and affect. His speech is normal.   Nursing note and vitals reviewed. Diagnostic Study Results     Labs -     Recent Results (from the past 12 hour(s))   D DIMER    Collection Time: 08/25/19  2:35 PM   Result Value Ref Range    D-dimer 0.31 0.00 - 0.65 mg/L FEU       Radiologic Studies -   XR TIB/FIB RT   Final Result   IMPRESSION: No acute abnormality. CT Results  (Last 48 hours)    None        CXR Results  (Last 48 hours)    None        Medical Decision Making   I am the first provider for this patient. I reviewed the vital signs, available nursing notes, past medical history, past surgical history, family history and social history. Vital Signs-Reviewed the patient's vital signs. Patient Vitals for the past 12 hrs:   Temp Pulse Resp BP SpO2   08/25/19 1339 97.9 °F (36.6 °C) 99 17 (!) 143/92 99 %       Pulse Oximetry Analysis - 99% on RA    Records Reviewed: Nursing Notes, Old Medical Records, Previous Radiology Studies, Previous Laboratory Studies and     Provider Notes (Medical Decision Making):   DDx: Periostitis, stress fracture, doubt DVT, strain    ED Course:   Initial assessment performed. The patients presenting problems have been discussed, and they are in agreement with the care plan formulated and outlined with them. I have encouraged them to ask questions as they arise throughout their visit. Disposition:  Discharge    PLAN:  1. Discharge Medication List as of 8/25/2019  3:18 PM      START taking these medications    Details   ibuprofen (MOTRIN) 600 mg tablet Take 1 Tab by mouth every eight (8) hours as needed for Pain., Print, Disp-20 Tab, R-0         CONTINUE these medications which have NOT CHANGED    Details   ondansetron (ZOFRAN ODT) 4 mg disintegrating tablet Take 1 Tab by mouth every eight (8) hours as needed for Nausea. , Print, Disp-10 Tab, R-0           2.    Follow-up Information     Follow up With Specialties Details Why Contact [address]  Schedule an appointment as soon as possible for a visit PRIMARY CARE: call to schedule follow up [address]  [phone]        Return to ED if worse     Diagnosis     Clinical Impression:   1.  Right leg pain

## 2019-08-25 NOTE — LETTER
[address] 
[facility] EMERGENCY DEPT 
[address] [place] [id number] [phone] Work/School Note Date: 8/25/2019 To Whom It May concern: 
 
Damián Campo was seen and treated today in the emergency room by the following provider(s): 
Attending Provider: Gabrielle Lund MD 
Physician Assistant: SHAILA Hernandes. Damián Campo may return to work on [address] [place] [address]. Sincerely, Cosimo Goltz, PA

## 2019-08-25 NOTE — ED TRIAGE NOTES
Pt. Presents to ED today for complaints of R lower leg pain that is affecting his shin and calf. Pt. Alert and oriented x4. PT. Placed in position of comfort with call bell in reach.

## 2019-08-25 NOTE — ED NOTES
Patient discharged by SHAILA Perez. Patient provided with discharge instructions Rx and instructions on follow up care. Patient out of ED ambulatory accompanied by family.

## 2019-09-15 ENCOUNTER — HOSPITAL ENCOUNTER (EMERGENCY)
Age: 49
Discharge: HOME | End: 2019-09-15
Attending: EMERGENCY MEDICINE
Payer: COMMERCIAL

## 2019-09-15 ENCOUNTER — APPOINTMENT (OUTPATIENT)
Dept: GENERAL RADIOLOGY | Age: 49
End: 2019-09-15
Attending: EMERGENCY MEDICINE
Payer: COMMERCIAL

## 2019-09-15 VITALS
DIASTOLIC BLOOD PRESSURE: 84 MMHG | HEART RATE: 99 BPM | HEIGHT: 72 IN | RESPIRATION RATE: 14 BRPM | BODY MASS INDEX: 22.99 KG/M2 | WEIGHT: 169.75 LBS | OXYGEN SATURATION: 99 % | SYSTOLIC BLOOD PRESSURE: 95 MMHG | TEMPERATURE: 98.1 F

## 2019-09-15 DIAGNOSIS — L97.509 OTHER SPECIFIED DIABETES MELLITUS WITH FOOT ULCER, UNSPECIFIED WHETHER LONG TERM INSULIN USE (HCC): ICD-10-CM

## 2019-09-15 DIAGNOSIS — L97.519 DIABETIC ULCER OF TOE OF RIGHT FOOT ASSOCIATED WITH DIABETES MELLITUS OF OTHER TYPE, UNSPECIFIED ULCER STAGE (HCC): Primary | ICD-10-CM

## 2019-09-15 DIAGNOSIS — G62.9 NEUROPATHY: ICD-10-CM

## 2019-09-15 DIAGNOSIS — E13.621 DIABETIC ULCER OF TOE OF RIGHT FOOT ASSOCIATED WITH DIABETES MELLITUS OF OTHER TYPE, UNSPECIFIED ULCER STAGE (HCC): Primary | ICD-10-CM

## 2019-09-15 DIAGNOSIS — E13.621 OTHER SPECIFIED DIABETES MELLITUS WITH FOOT ULCER, UNSPECIFIED WHETHER LONG TERM INSULIN USE (HCC): ICD-10-CM

## 2019-09-15 LAB
BASOPHILS # BLD: 0 K/UL (ref 0–0.1)
BASOPHILS NFR BLD: 1 % (ref 0–1)
DIFFERENTIAL METHOD BLD: ABNORMAL
EOSINOPHIL # BLD: 0.2 K/UL (ref 0–0.4)
EOSINOPHIL NFR BLD: 3 % (ref 0–7)
ERYTHROCYTE [DISTWIDTH] IN BLOOD BY AUTOMATED COUNT: 14 % (ref 11.5–14.5)
ERYTHROCYTE [SEDIMENTATION RATE] IN BLOOD: 24 MM/HR (ref 0–15)
HCT VFR BLD AUTO: 36.1 % (ref 36.6–50.3)
HGB BLD-MCNC: 11.7 G/DL (ref 12.1–17)
IMM GRANULOCYTES # BLD AUTO: 0 K/UL (ref 0–0.04)
IMM GRANULOCYTES NFR BLD AUTO: 0 % (ref 0–0.5)
LYMPHOCYTES # BLD: 1.6 K/UL (ref 0.8–3.5)
LYMPHOCYTES NFR BLD: 28 % (ref 12–49)
MCH RBC QN AUTO: 26.4 PG (ref 26–34)
MCHC RBC AUTO-ENTMCNC: 32.4 G/DL (ref 30–36.5)
MCV RBC AUTO: 81.3 FL (ref 80–99)
MONOCYTES # BLD: 0.5 K/UL (ref 0–1)
MONOCYTES NFR BLD: 9 % (ref 5–13)
NEUTS SEG # BLD: 3.6 K/UL (ref 1.8–8)
NEUTS SEG NFR BLD: 59 % (ref 32–75)
NRBC # BLD: 0 K/UL (ref 0–0.01)
NRBC BLD-RTO: 0 PER 100 WBC
PLATELET # BLD AUTO: 243 K/UL (ref 150–400)
PMV BLD AUTO: 8.5 FL (ref 8.9–12.9)
RBC # BLD AUTO: 4.44 M/UL (ref 4.1–5.7)
WBC # BLD AUTO: 6 K/UL (ref 4.1–11.1)

## 2019-09-15 PROCEDURE — 99283 EMERGENCY DEPT VISIT LOW MDM: CPT

## 2019-09-15 PROCEDURE — 74011250637 HC RX REV CODE- 250/637: Performed by: EMERGENCY MEDICINE

## 2019-09-15 PROCEDURE — 85025 COMPLETE CBC W/AUTO DIFF WBC: CPT

## 2019-09-15 PROCEDURE — 36415 COLL VENOUS BLD VENIPUNCTURE: CPT

## 2019-09-15 PROCEDURE — 85652 RBC SED RATE AUTOMATED: CPT

## 2019-09-15 PROCEDURE — 73660 X-RAY EXAM OF TOE(S): CPT

## 2019-09-15 RX ORDER — CEPHALEXIN 500 MG/1
500 CAPSULE ORAL 4 TIMES DAILY
Qty: 56 CAP | Refills: 0 | Status: SHIPPED | OUTPATIENT
Start: 2019-09-15 | End: 2019-09-29

## 2019-09-15 RX ORDER — SULFAMETHOXAZOLE AND TRIMETHOPRIM 800; 160 MG/1; MG/1
2 TABLET ORAL
Status: COMPLETED | OUTPATIENT
Start: 2019-09-15 | End: 2019-09-15

## 2019-09-15 RX ORDER — CEPHALEXIN 250 MG/1
500 CAPSULE ORAL
Status: COMPLETED | OUTPATIENT
Start: 2019-09-15 | End: 2019-09-15

## 2019-09-15 RX ORDER — SULFAMETHOXAZOLE AND TRIMETHOPRIM 800; 160 MG/1; MG/1
1 TABLET ORAL 2 TIMES DAILY
Qty: 28 TAB | Refills: 0 | Status: SHIPPED | OUTPATIENT
Start: 2019-09-15 | End: 2019-09-29

## 2019-09-15 RX ORDER — GABAPENTIN 800 MG/1
800 TABLET ORAL 3 TIMES DAILY
COMMUNITY
End: 2019-10-14 | Stop reason: SDUPTHER

## 2019-09-15 RX ADMIN — SULFAMETHOXAZOLE AND TRIMETHOPRIM 2 TABLET: 800; 160 TABLET ORAL at 23:08

## 2019-09-15 RX ADMIN — CEPHALEXIN 500 MG: 250 CAPSULE ORAL at 23:08

## 2019-09-16 NOTE — ED PROVIDER NOTES
EMERGENCY DEPARTMENT HISTORY AND PHYSICAL EXAM      Date: 9/15/2019  Patient Name: Cristina Almanza    History of Presenting Illness     Chief Complaint   Patient presents with    Wound Check       History Provided By: Patient    HPI: Cristina Almanza, 52 y.o. male with PMHx as noted below presents the emergency department for evaluation of foot ulcer. Patient states that he has a history of diabetes and diabetic neuropathy and has had an ulcer on his right great toe now for the last 1 month. Patient states this seems to be getting worse and becoming inflamed. Otherwise having no systemic symptoms. Patient states that he does not follow-up with his primary care physician regularly and is never seen a podiatrist.      PCP: None    Current Outpatient Medications   Medication Sig Dispense Refill    gabapentin (NEURONTIN) 800 mg tablet Take 800 mg by mouth three (3) times daily.  cephALEXin (KEFLEX) 500 mg capsule Take 1 Cap by mouth four (4) times daily for 14 days. 56 Cap 0    trimethoprim-sulfamethoxazole (BACTRIM DS) 160-800 mg per tablet Take 1 Tab by mouth two (2) times a day for 14 days. 28 Tab 0    ibuprofen (MOTRIN) 600 mg tablet Take 1 Tab by mouth every eight (8) hours as needed for Pain. 20 Tab 0    ondansetron (ZOFRAN ODT) 4 mg disintegrating tablet Take 1 Tab by mouth every eight (8) hours as needed for Nausea. 10 Tab 0       Past History     Past Medical History:  Past Medical History:   Diagnosis Date    Diabetes ([address]     Neuropathy        Past Surgical History:  History reviewed. No pertinent surgical history. Family History:  History reviewed. No pertinent family history. Social History:  Social History     Tobacco Use    Smoking status: Never Smoker   Substance Use Topics    Alcohol use: Never     Frequency: Never    Drug use: Never       Allergies:  No Known Allergies      Review of Systems   Review of Systems  Constitutional: Negative for fever, chills, and fatigue.    HENT: Negative for congestion, sore throat, rhinorrhea, sneezing and neck stiffness   Eyes: Negative for discharge and redness. Respiratory: Negative for  shortness of breath, wheezing   Cardiovascular: Negative for chest pain, palpitations   Gastrointestinal: Negative for nausea, vomiting, abdominal pain, constipation, diarrhea and blood in stool. Genitourinary: Negative for dysuria, hematuria, flank pain, decreased urine volume, discharge,   Musculoskeletal: Negative for myalgias or joint pain . Skin: Negative for rash or lesions . Neurological: Negative weakness, light-headedness, numbness and headaches. Physical Exam   Physical Exam    GENERAL: alert and oriented, no acute distress  EYES: PEERL, No injection, discharge or icterus. ENT: Mucous membranes pink and moist.  NECK: Supple  LUNGS: Airway patent. Non-labored respirations. Breath sounds clear with good air entry bilaterally. HEART: Regular rate and rhythm. No peripheral edema  ABDOMEN: Non-distended and non-tender, without guarding or rebound. SKIN:  warm, dry, ulcer on the right great toe with surrounding erythema or warmth. He has full range of motion at DIP and PIP joints without significant discomfort.   MSK/EXTREMITIES: Without swelling, tenderness or deformity, symmetric with normal ROM  NEUROLOGICAL: Alert, oriented      Diagnostic Study Results     Labs -     Recent Results (from the past 12 hour(s))   CBC WITH AUTOMATED DIFF    Collection Time: 09/15/19 10:39 PM   Result Value Ref Range    WBC 6.0 4.1 - 11.1 K/uL    RBC 4.44 4.10 - 5.70 M/uL    HGB 11.7 (L) 12.1 - 17.0 g/dL    HCT 36.1 (L) 36.6 - 50.3 %    MCV 81.3 80.0 - 99.0 FL    MCH 26.4 26.0 - 34.0 PG    MCHC 32.4 30.0 - 36.5 g/dL    RDW 14.0 11.5 - 14.5 %    PLATELET 561 303 - 485 K/uL    MPV 8.5 (L) 8.9 - 12.9 FL    NRBC 0.0 0  WBC    ABSOLUTE NRBC 0.00 0.00 - 0.01 K/uL    NEUTROPHILS 59 32 - 75 %    LYMPHOCYTES 28 12 - 49 %    MONOCYTES 9 5 - 13 %    EOSINOPHILS 3 0 - 7 % BASOPHILS 1 0 - 1 %    IMMATURE GRANULOCYTES 0 0.0 - 0.5 %    ABS. NEUTROPHILS 3.6 1.8 - 8.0 K/UL    ABS. LYMPHOCYTES 1.6 0.8 - 3.5 K/UL    ABS. MONOCYTES 0.5 0.0 - 1.0 K/UL    ABS. EOSINOPHILS 0.2 0.0 - 0.4 K/UL    ABS. BASOPHILS 0.0 0.0 - 0.1 K/UL    ABS. IMM. GRANS. 0.0 0.00 - 0.04 K/UL    DF AUTOMATED         Radiologic Studies -   XR GREAT TOE RT MIN 2 V   Final Result   IMPRESSION: No acute osseous abnormality. CT Results  (Last 48 hours)    None        CXR Results  (Last 48 hours)    None            Medical Decision Making   I am the first provider for this patient. I reviewed the vital signs, available nursing notes, past medical history, past surgical history, family history and social history. Vital Signs-Reviewed the patient's vital signs. Patient Vitals for the past 12 hrs:   Temp Pulse Resp BP SpO2   09/15/19 2013 98.1 °F (36.7 °C) 99 14 95/84 99 %           Records Reviewed: Nursing Notes and Old Medical Records    Provider Notes (Medical Decision Making): On presentation, the patient is well appearing, in no acute distress with normal vital signs. Based on my history and exam the differential diagnosis for this patient includes diabetic foot ulcer, cellulitis, abscess, osteomyelitis. Exam consistent with likely infected diabetic ulcer. No signs of osteo-on x-ray or systemic symptoms. At this time, do feel this can be managed on an outpatient basis with antibiotics and podiatry follow-up. The patient is in agreement with this plan and would like to be discharged. I have instructed him to call podiatry in the next 1 to 2 days to set up an appointment as well as wound care. He is in agreement with this plan. ED Course:   Initial assessment performed. The patients presenting problems have been discussed, and they are in agreement with the care plan formulated and outlined with them. I have encouraged them to ask questions as they arise throughout their visit.          PROGRESS NOTE:  The patient has been re-evaluated and is ready for discharge. Reviewed available results with patient and have counseled them on diagnosis and care plan. They have expressed understanding, and all their questions have been answered. They agree with plan and agree to have pt F/U as recommended, or return to the ED if their sxs worsen. Discharge instructions have been provided and explained to them, along with reasons to have pt return to the ED. The patient is amenable to discharge so will discharge pt at this time    Cas Palomino MD        Disposition:  home    PLAN:  1. Current Discharge Medication List      START taking these medications    Details   cephALEXin (KEFLEX) 500 mg capsule Take 1 Cap by mouth four (4) times daily for 14 days. Qty: 56 Cap, Refills: 0      trimethoprim-sulfamethoxazole (BACTRIM DS) 160-800 mg per tablet Take 1 Tab by mouth two (2) times a day for 14 days. Qty: 28 Tab, Refills: 0           2. Follow-up Information     Follow up With Specialties Details Why Contact Info    [facility] EMERGENCY DEPT Emergency Medicine  If symptoms worsen [address] [place] [address]  [phone]    [facility] Schedule an appointment as soon as possible for a visit in 2 days  [address] [address] [place]  Schedule an appointment as soon as possible for a visit in 2 days  [facility]  [phone]        Return to ED if worse     Diagnosis     Clinical Impression:   1. Diabetic ulcer of toe of right foot associated with diabetes mellitus of other type, unspecified ulcer stage ([address]    2. Other specified diabetes mellitus with foot ulcer, unspecified whether long term insulin use ([address]    3.  Neuropathy

## 2019-09-16 NOTE — ED NOTES
Patient discharged by Yahir Guerrero MD. Patient provided with discharge instructions Rx and instructions on follow up care. Patient out of ED ambulatory accompanied by self.

## 2019-09-16 NOTE — ED TRIAGE NOTES
Sore on right 1st toe for 1 month. Wore new steel toed shoes and took skin off now open and bleeding. History of diabetes with neuropathy.

## 2019-09-16 NOTE — DISCHARGE INSTRUCTIONS
Patient Education        Diabetic Foot Ulcer: Care Instructions  Your Care Instructions  Diabetes can damage the nerve endings and blood vessels in your feet. That means you are less likely to notice when your feet are injured. A small skin problem like a callus, blister, or cracked skin can turn into a larger sore, called a foot ulcer. Foot ulcers form most often on the pad (ball) of the foot or the bottom of the big toe. You can also get them on the top and bottom of each toe. Foot ulcers can get infected. If the infection is severe, then tissue in the foot can die. This is called gangrene. In that case, one or more of the toes, part or all of the foot, and sometimes part of the leg may have to be removed (amputated). Your doctor may have removed the dead tissue and cleaned the ulcer. Your foot wound may be wrapped in a protective bandage. It is very important to keep your weight off your injured foot. After a foot ulcer has formed, it will not heal as long as you keep putting weight on the area. Always get early treatment for foot problems. A minor irritation can lead to a major problem if it's not taken care of soon. Follow-up care is a key part of your treatment and safety. Be sure to make and go to all appointments, and call your doctor if you are having problems. It's also a good idea to know your test results and keep a list of the medicines you take. How can you care for yourself at home? · Follow your doctor's instructions about keeping pressure off the foot ulcer. You may need to use crutches or a wheelchair. Or you may wear a cast or a walking boot. · Follow your doctor's instructions on how to clean the ulcer and change the bandage. · If your doctor prescribed antibiotics, take them as directed. Do not stop taking them just because you feel better. You need to take the full course of antibiotics.   To prevent foot ulcers  · Keep your blood sugar close to normal by watching what and how much you eat. Track your blood sugar, take medicines if prescribed, and get regular exercise. · Do not smoke. Smoking affects blood flow and can make foot problems worse. If you need help quitting, talk to your doctor about stop-smoking programs and medicines. These can increase your chances of quitting for good. · Do not go barefoot. Protect your feet by wearing shoes that fit well. Choose shoes that are made of materials that are flexible and breathable, such as leather or cloth. · Inspect your feet daily for blisters, cuts, cracks, or sores. If you can't see well, use a mirror or have someone help you. · Have your doctor check your feet during each visit. If you have a foot problem, see your doctor. Do not try to treat your foot problem on your own. Home remedies or treatments that you can buy without a prescription (such as corn removers) can be harmful. When should you call for help? Call your doctor now or seek immediate medical care if:    · You have symptoms of infection, such as:  ? Increased pain, swelling, warmth, or redness. ? Red streaks leading from the area. ? Pus draining from the area. ? A fever.    Watch closely for changes in your health, and be sure to contact your doctor if:    · You have a new problem with your feet, such as:  ? A new sore or ulcer. ? A break in the skin that is not healing after several days. ? Bleeding corns or calluses. ? An ingrown toenail.     · You do not get better as expected. Where can you learn more? Go to http://leticia-zurdo.info/. Enter T131 in the search box to learn more about \"Diabetic Foot Ulcer: Care Instructions. \"  Current as of: July 25, 2018  Content Version: 12.1  © 1244-3770 [employer]. Care instructions adapted under license by [employer] (which disclaims liability or warranty for this information).  If you have questions about a medical condition or this instruction, always ask your healthcare professional. [address] any warranty or liability for your use of this information. Patient Education        Neuropathic Pain: Care Instructions  Your Care Instructions    Neuropathic pain is caused by pressure on or damage to your nerves. It's often simply called nerve pain. Some people feel this type of pain all the time. For others, it comes and goes. Diabetes, shingles, or an injury can cause nerve pain. Many people say the pain feels sharp, burning, or stabbing. But some people feel it as a dull ache. In some cases, it makes your skin very sensitive. So touch, pressure, and other sensations that did not hurt before may now cause pain. It's important to know that this kind of pain is real and can affect your quality of life. It's also important to know that treatment can help. Treatment includes pain medicines, exercise, and physical therapy. Medicines can help reduce the number of pain signals that travel over the nerves. This can make the painful areas less sensitive. It can also help you sleep better and improve your mood. But medicines are only one part of successful treatment. Most people do best with more than one kind of treatment. Your doctor may recommend that you try cognitive-behavioral therapy and stress management. Or, if needed, you may decide to try to quit smoking, lower your blood pressure, or better control blood sugar. These kinds of healthy changes can also make a difference. If you feel that your treatment is not working, talk to your doctor. And be sure to tell your doctor if you think you might be depressed or anxious. These are common problems that can also be treated. Follow-up care is a key part of your treatment and safety. Be sure to make and go to all appointments, and call your doctor if you are having problems. It's also a good idea to know your test results and keep a list of the medicines you take.   How can you care for yourself at home?  · Be safe with medicines. Read and follow all instructions on the label. ? If the doctor gave you a prescription medicine for pain, take it as prescribed. ? If you are not taking a prescription pain medicine, ask your doctor if you can take an over-the-counter medicine. · Save hard tasks for days when you have less pain. Follow a hard task with an easy task. And remember to take breaks. · Relax, and reduce stress. You may want to try deep breathing or meditation. These can help. · Keep moving. Gentle, daily exercise can help reduce pain. Your doctor or physical therapist can tell you what type of exercise is best for you. This may include walking, swimming, and stationary biking. It may also include stretches and range-of-motion exercises. · Try heat, cold packs, and massage. · Get enough sleep. Constant pain can make you more tired. If the pain makes it hard to sleep, talk with your doctor. · Think positively. Your thoughts can affect your pain. Do fun things to distract yourself from the pain. See a movie, read a book, listen to music, or spend time with a friend. · Keep a pain diary. Try to write down how strong your pain is and what it feels like. Also try to notice and write down how your moods, thoughts, sleep, activities, and medicine affect your pain. These notes can help you and your doctor find the best ways to treat your pain. Reducing constipation caused by pain medicine  Pain medicines often cause constipation. To reduce constipation:  · Include fruits, vegetables, beans, and whole grains in your diet each day. These foods are high in fiber. · Drink plenty of fluids, enough so that your urine is light yellow or clear like water. If you have kidney, heart, or liver disease and have to limit fluids, talk with your doctor before you increase the amount of fluids you drink. · Get some exercise every day. Build up slowly to 30 to 60 minutes a day on 5 or more days of the week.   · Take a fiber supplement, such as Citrucel or Metamucil, every day if needed. Read and follow all instructions on the label. · Schedule time each day for a bowel movement. Having a daily routine may help. Take your time and do not strain when having a bowel movement. · Ask your doctor about a laxative. The goal is to have one easy bowel movement every 1 to 2 days. Do not let constipation go untreated for more than 3 days. When should you call for help? Call your doctor now or seek immediate medical care if:    · You feel sad, anxious, or hopeless for more than a few days. This could mean you are depressed. Depression is common in people who have a lot of pain. But it can be treated.     · You have trouble with bowel movements, such as:  ? No bowel movement in 3 days. ? Blood in the anal area, in your stool, or on the toilet paper. ? Diarrhea for more than 24 hours.    Watch closely for changes in your health, and be sure to contact your doctor if:    · Your pain is getting worse.     · You can't sleep because of pain.     · You are very worried or anxious about your pain.     · You have trouble taking your pain medicine.     · You have any concerns about your pain medicine or its side effects.     · You have vomiting or cramps for more than 2 hours. Where can you learn more? Go to http://leticia-zurdo.info/. Enter J792 in the search box to learn more about \"Neuropathic Pain: Care Instructions. \"  Current as of: March 28, 2019  Content Version: 12.1  © 9420-0407 [employer]. Care instructions adapted under license by [employer] (which disclaims liability or warranty for this information). If you have questions about a medical condition or this instruction, always ask your healthcare professional. [address] any warranty or liability for your use of this information.

## 2019-09-16 NOTE — ED NOTES
Pt arrived from triage ambulatory, pt has wounds to both his great toes on the medial sides. Pt's toes are swollen. Pt reported using some new boots recently.

## 2019-10-12 ENCOUNTER — HOSPITAL ENCOUNTER (EMERGENCY)
Age: 49
Discharge: HOME | End: 2019-10-12
Attending: EMERGENCY MEDICINE

## 2019-10-12 VITALS
WEIGHT: 179 LBS | HEART RATE: 84 BPM | OXYGEN SATURATION: 97 % | TEMPERATURE: 98 F | BODY MASS INDEX: 24.24 KG/M2 | DIASTOLIC BLOOD PRESSURE: 87 MMHG | RESPIRATION RATE: 16 BRPM | HEIGHT: 72 IN | SYSTOLIC BLOOD PRESSURE: 140 MMHG

## 2019-10-12 DIAGNOSIS — R03.0 ELEVATED BLOOD PRESSURE READING: ICD-10-CM

## 2019-10-12 DIAGNOSIS — S05.01XA ABRASION OF RIGHT CORNEA, INITIAL ENCOUNTER: Primary | ICD-10-CM

## 2019-10-12 PROCEDURE — 74011000250 HC RX REV CODE- 250: Performed by: PHYSICIAN ASSISTANT

## 2019-10-12 PROCEDURE — 99283 EMERGENCY DEPT VISIT LOW MDM: CPT

## 2019-10-12 RX ORDER — CIPROFLOXACIN HYDROCHLORIDE 3.5 MG/ML
1 SOLUTION/ DROPS TOPICAL 2 TIMES DAILY
Qty: 2.5 ML | Refills: 0 | Status: SHIPPED | OUTPATIENT
Start: 2019-10-12 | End: 2019-10-12

## 2019-10-12 RX ORDER — TETRACAINE HYDROCHLORIDE 5 MG/ML
2 SOLUTION OPHTHALMIC
Status: COMPLETED | OUTPATIENT
Start: 2019-10-12 | End: 2019-10-12

## 2019-10-12 RX ORDER — CIPROFLOXACIN HYDROCHLORIDE 3.5 MG/ML
1 SOLUTION/ DROPS TOPICAL 2 TIMES DAILY
Qty: 2.5 ML | Refills: 0 | Status: SHIPPED | OUTPATIENT
Start: 2019-10-12 | End: 2019-10-22

## 2019-10-12 RX ORDER — IBUPROFEN 800 MG/1
800 TABLET ORAL
Qty: 20 TAB | Refills: 0 | Status: SHIPPED | OUTPATIENT
Start: 2019-10-12 | End: 2019-10-12

## 2019-10-12 RX ORDER — IBUPROFEN 800 MG/1
800 TABLET ORAL
Qty: 20 TAB | Refills: 0 | Status: SHIPPED | OUTPATIENT
Start: 2019-10-12 | End: 2019-10-19

## 2019-10-12 RX ADMIN — TETRACAINE HYDROCHLORIDE 2 DROP: 5 SOLUTION OPHTHALMIC at 17:49

## 2019-10-12 RX ADMIN — FLUORESCEIN SODIUM 1 STRIP: 1 STRIP OPHTHALMIC at 18:11

## 2019-10-12 NOTE — LETTER
[address] 
[facility] [place] EM[facility] 
[address] [facility] [place] [id number] 
[phone] Work/School Note Date: 10/12/2019 To Whom It May concern: 
 
Meena Gaviria was seen and treated today in the emergency room by the following provider(s): 
Attending Provider: Liz Guan MD 
Physician Assistant: Ora Rodriguez. Meena Gaviria  May return to work in 2 days Sincerely, 
 
 
 
 
Zeinab Santos PA

## 2019-10-12 NOTE — DISCHARGE INSTRUCTIONS
Thank you for allowing us to take care of you today! We hope we addressed all of your concerns and needs. We strive to provide excellent quality care in the Emergency Department. You will receive a survey after your visit to evaluate the care you were provided. Should you receive a survey from us, we invite you to share your experience and tell us what made it excellent. It was a pleasure serving you, we invite you to share your experience with us, in our pursuit for excellence, should you be selected to receive a survey. The exam and treatment you received in the Emergency Department were for an urgent problem and are not intended as complete care. It is important that you follow up with a doctor, nurse practitioner, or physician assistant for ongoing care. If your symptoms become worse or you do not improve as expected and you are unable to reach your usual health care provider, you should return to the Emergency Department. We are available 24 hours a day. Please take your discharge instructions with you when you go to your follow-up appointment. If you have any problem arranging a follow-up appointment, contact the Emergency Department immediately. If a prescription has been provided, please have it filled as soon as possible to prevent a delay in treatment. Read the entire medication instruction sheet provided to you by the pharmacy. If you have any questions or reservations about taking the medication due to side effects or interactions with other medications, please call your primary care physician or contact the ER to speak with the charge nurse. Make an appointment with your family doctor or the physician you were referred to for follow-up of this visit as instructed on your discharge paperwork, as this is mandatory follow-up. Return to the ER if you are unable to be seen or if you are unable to be seen in a timely manner.     If you have any problem arranging the follow-up visit, contact the Emergency Department immediately. I hope you feel better and thank you again for allow us to provide you with excellent care today at [facility]! Warmest regards,    Matthew Patel PA-C  Emergency Medicine Physician Assistant  [facility]      Vitals:    10/12/19 1626   BP: (!) 149/92   BP 1 Location: Right arm   BP Patient Position: At rest   Pulse: 98   Resp: 12   Temp: 98 °F (36.7 °C)   SpO2: 99%   Weight: 81.2 kg (179 lb)   Height: 6' (1.829 m)       No results found for this or any previous visit (from the past 12 hour(s)). No orders to display     CT Results  (Last 48 hours)    None            [facility] Departments     For adult and child immunizations, family planning, TB screening, STD testing and women's health services. [facility]: [place] [phone]      [facility] 25   657 [facility]   [address]   170 [facility]: [facility] 200 [facility] [phone]      [facility]4 [facility]          [address] [address]     For primary care services, woman and child wellness, and some clinics providing specialty care. [facility] -- [facility]. [address] [place] [address] [phone]   [address] [place] [address] 200 [facility] [address] [place] [address] [phone]   339 [facility] [address] [place] [address] [phone] [address] [place] [address] 5850  Community  [phone]   [facility]7 [address] [phone]   [facility] 81 [facility] [phone]   [facility] [address] [place] [address] [phone]   [facility]: [facility] [address] [facility] [address] [phone], ext Robi 79 [facility], [address] [phone]     [address] [phone]   [facility] 5850 [facility]  [phone]   [facility]  [address], [address] ([email or web address]) [phone]         Sexual Health/Woman Wellness Clinics    For STD/HIV testing and treatment, pregnancy testing and services, men's health, birth control services, LGBT services, and hepatitis/HPV vaccine services. [employer] for [place] [employer] 201 [facility] 75 [facility] 1579 [address] [facility] [phone]   [facility] [address], 5th floor [phone]   Pregnancy [address] [facility] [address]  [place] [phone]         [place] 9967 [facility] [address] [place] [address]   [phone]   [place]   [phone]   Women, Infant and Children's Services: [address] [phone]       600 [facility]   [phone]   [facility] [place]   [phone]   [facility] Psychiat[facility]     [phone]   [address] Crisis   1212 [facility] [phone]       Local Primary Care Physicians  64 [facility] [phone]  MD Sabrina Flores MD Murry Rowan, MD [facility] Community Doctors [phone]  Yung John, FNP  MD Haven Whitney MD Norva Maffucci, MD   [facility] 83 [phone]  MD Marlon Magana MD [address] [facility] [phone]  MD Vanessa Dooley MD Gwynneth Credit, MD Jeffrie Beverage, MD   [facility] [phone]  MD Zamzam BARCLAY MD Margurette Gaudy, NP [address] [phone]  MD Helio Alejandra MD Alanda Nares, MD [place], MD Tammy Hancock, MD Jenel Lesser, MD [facility], 8531 [facility] Emeli Wick MD [facility] [phone]  Xiomy Valdovinos, MD Ivonne Payne, NP  Maria A Castano, MD Garth Whiting, MD Desi Butcher, MD Tiffany Juares, MD Deangelo Fernández MD   6442 [facility] [phone]  Rhianna Damon, MD [place], [facility]  Vianey Alarcon, NP  Denise Proctor, MD Leigh Fierro, MD Jeremiah Booth, MD Ashley Diallo MD [facility] [phone]  Jorge Tyler, MD Carlos Mitchell, MD Elton Lange, MD Prasad Alaniz, MD Abimbola Pacheco, MD   [facility] [phone]  MD Jovon Colin MD [place] [phone]  Paul Noland, MD Lisa Kent, MD Pito Nagy, MD   [facility] [phone]  Lesia Carrion, MD Violeta Schwab, MD Ida Siddiqi, MD Kate Godwin, MD Karena [address], MD Rosalio Barraza, NP  Ritchie Osei MD [facility]9 [facility]   [phone]  MD Cornelius Lugo, MD Austen Mena MD   2102 [facility] [phone]  [place] [address], MD Christina Garza, [facility]  RAJAN Denney, [facility]  RAJAN Matute, MD Rosangela Suazo, JANNIE Johnson, DO Miscellaneous:  [place] -[phone]          Corneal Scratches: Care Instructions  Your Care Instructions    The cornea is the clear surface that covers the front of the eye. When a speck of dirt, a wood chip, an insect, or another object flies into your eye, it can cause a painful scratch on the cornea. Wearing contact lenses too long or rubbing your eyes can also scratch the cornea. Small scratches usually heal in a day or two. Deeper scratches may take longer. If you have had a foreign object removed from your eye or you have a corneal scratch, you will need to watch for infection and vision problems while your eye heals.   Follow-up care is a key part of your treatment and safety. Be sure to make and go to all appointments, and call your doctor if you are having problems. It's also a good idea to know your test results and keep a list of the medicines you take. How can you care for yourself at home? · The doctor probably used a medicine during your exam to numb your eye. When it wears off in 30 to 60 minutes, your eye pain may come back. Take pain medicines exactly as directed. ? If the doctor gave you a prescription medicine for pain, take it as prescribed. ? If you are not taking a prescription pain medicine, ask your doctor if you can take an over-the-counter medicine. ? Do not take two or more pain medicines at the same time unless the doctor told you to. Many pain medicines have acetaminophen, which is Tylenol. Too much acetaminophen (Tylenol) can be harmful. · Do not rub your injured eye. Rubbing can make it worse. · Use the prescribed eyedrops or ointment as directed. Be sure the dropper or bottle tip is clean. To put in eyedrops or ointment:  ? Tilt your head back, and pull your lower eyelid down with one finger. ? Drop or squirt the medicine inside the lower lid. ? Close your eye for 30 to 60 seconds to let the drops or ointment move around. ? Do not touch the ointment or dropper tip to your eyelashes or any other surface. · Do not use your contact lens in your hurt eye until your doctor says you can. Also, do not wear eye makeup until your eye has healed. · Do not drive if you have blurred vision. · Bright light may hurt. Sunglasses can help. · To prevent eye injuries in the future, wear safety glasses or goggles when you work with machines or tools, mow the lawn, or ride a bike or motorcycle. When should you call for help? Call your doctor now or seek immediate medical care if:    · You have signs of an eye infection, such as:  ? Pus or thick discharge coming from the eye.  ? Redness or swelling around the eye.  ?  A fever.     · You have new or worse eye pain.     · You have vision changes.     · It feels like there is something in your eye.     · Light hurts your eye.    Watch closely for changes in your health, and be sure to contact your doctor if:    · You do not get better as expected. Where can you learn more? Go to http://leticia-zurdo.info/. Enter Q836 in the search box to learn more about \"Corneal Scratches: Care Instructions. \"  Current as of: May 5, 2019  Content Version: 12.2  © 7735-6180 [employer]. Care instructions adapted under license by [employer] (which disclaims liability or warranty for this information). If you have questions about a medical condition or this instruction, always ask your healthcare professional. [address] any warranty or liability for your use of this information. Patient Education        Elevated Blood Pressure: Care Instructions  Your Care Instructions    Blood pressure is a measure of how hard the blood pushes against the walls of your arteries. It's normal for blood pressure to go up and down throughout the day. But if it stays up over time, you have high blood pressure. Two numbers tell you your blood pressure. The first number is the systolic pressure. It shows how hard the blood pushes when your heart is pumping. The second number is the diastolic pressure. It shows how hard the blood pushes between heartbeats, when your heart is relaxed and filling with blood. An ideal blood pressure in adults is less than 120/80 (say \"120 over 80\"). High blood pressure is 140/90 or higher. You have high blood pressure if your top number is 140 or higher or your bottom number is 90 or higher, or both. The main test for high blood pressure is simple, fast, and painless. To diagnose high blood pressure, your doctor will test your blood pressure at different times.  After testing your blood pressure, your doctor may ask you to test it again when you are home. If you are diagnosed with high blood pressure, you can work with your doctor to make a long-term plan to manage it. Follow-up care is a key part of your treatment and safety. Be sure to make and go to all appointments, and call your doctor if you are having problems. It's also a good idea to know your test results and keep a list of the medicines you take. How can you care for yourself at home? · Do not smoke. Smoking increases your risk for heart attack and stroke. If you need help quitting, talk to your doctor about stop-smoking programs and medicines. These can increase your chances of quitting for good. · Stay at a healthy weight. · Try to limit how much sodium you eat to less than 2,300 milligrams (mg) a day. Your doctor may ask you to try to eat less than 1,500 mg a day. · Be physically active. Get at least 30 minutes of exercise on most days of the week. Walking is a good choice. You also may want to do other activities, such as running, swimming, cycling, or playing tennis or team sports. · Avoid or limit alcohol. Talk to your doctor about whether you can drink any alcohol. · Eat plenty of fruits, vegetables, and low-fat dairy products. Eat less saturated and total fats. · Learn how to check your blood pressure at home. When should you call for help? Call your doctor now or seek immediate medical care if:  ? · Your blood pressure is much higher than normal (such as 180/110 or higher). ? · You think high blood pressure is causing symptoms such as:  ¨ Severe headache. ¨ Blurry vision. ? Watch closely for changes in your health, and be sure to contact your doctor if:  ? · You do not get better as expected. Where can you learn more? Go to http://leticia-zurdo.info/. Enter L842 in the search box to learn more about \"Elevated Blood Pressure: Care Instructions. \"  Current as of: September 21, 2016  Content Version: 11.4  © 9333-5885 Healthwise, [employer]. Care instructions adapted under license by [employer] (which disclaims liability or warranty for this information). If you have questions about a medical condition or this instruction, always ask your healthcare professional. Vivek Grimeshe any warranty or liability for your use of this information.

## 2019-10-12 NOTE — ED NOTES
Johana Whitaker. PAC reviewed discharge instructions with the patient. The patient verbalized understanding.

## 2019-10-12 NOTE — ED PROVIDER NOTES
EMERGENCY DEPARTMENT HISTORY AND PHYSICAL EXAM      Date: 10/12/2019  Patient Name: Lali Mcclellan    History of Presenting Illness     HPI: Lali Mcclellan is a 52 y.o. male with past medical history of diabetes, neuropathy, presents to the emergency room for right eye pain for 1 week. He reports that the symptoms started with purulent discharge from his right eye with itchiness and he says that he has been scratching his eye to get the discharge out of it believes he may have scratched his eye when he was doing so. He says that his pain is a 10 out of 10, constant, throbbing, nonradiating pain that is worse with bright lights. He denies nausea vomiting, fever, changes in vision, among other associated symptoms. Pertinent social history: None    Pertinent surgical history: None    PCP: None    Current Outpatient Medications   Medication Sig Dispense Refill    ciprofloxacin HCl (CILOXAN) 0.3 % ophthalmic solution Apply 1 Drop to eye two (2) times a day for 10 days. 2.5 mL 0    ibuprofen (MOTRIN) 800 mg tablet Take 1 Tab by mouth every six (6) hours as needed for Pain for up to 7 days. 20 Tab 0    gabapentin (NEURONTIN) 800 mg tablet Take 800 mg by mouth three (3) times daily.  ondansetron (ZOFRAN ODT) 4 mg disintegrating tablet Take 1 Tab by mouth every eight (8) hours as needed for Nausea. 10 Tab 0       Past History     Past Medical History:  Past Medical History:   Diagnosis Date    Diabetes ([address]     Neuropathy        Past Surgical History:  No past surgical history on file. Family History:  No family history on file. Social History:  Social History     Tobacco Use    Smoking status: Never Smoker   Substance Use Topics    Alcohol use: Never     Frequency: Never    Drug use: Never       Allergies:  No Known Allergies      Review of Systems   Review of Systems   Constitutional: Negative for chills and fever. HENT: Negative for congestion.     Eyes: Positive for photophobia, pain, discharge, redness and itching. Negative for visual disturbance. Respiratory: Negative for shortness of breath. Cardiovascular: Negative for chest pain. Gastrointestinal: Negative for nausea and vomiting. Neurological: Negative for light-headedness and headaches. Physical Exam     Vitals:    10/12/19 1626 10/12/19 1849   BP: (!) 149/92 140/87   Pulse: 98 84   Resp: 12 16   Temp: 98 °F (36.7 °C)    SpO2: 99% 97%   Weight: 81.2 kg (179 lb)    Height: 6' (1.829 m)      Physical Exam   Constitutional: He is oriented to person, place, and time. He appears well-developed and well-nourished. No distress. HENT:   Head: Normocephalic and atraumatic. Right Ear: External ear normal.   Left Ear: External ear normal.   Mouth/Throat: Oropharynx is clear and moist. No oropharyngeal exudate. TM's normal bilaterally   Eyes: Pupils are equal, round, and reactive to light. EOM are normal. Lids are everted and swept, no foreign bodies found. Right eye exhibits discharge and exudate. Right eye exhibits no chemosis. No foreign body present in the right eye. Right conjunctiva is injected. Left conjunctiva is not injected. Right eye exhibits normal extraocular motion and no nystagmus. Left eye exhibits normal extraocular motion and no nystagmus. Neck: Normal range of motion. Neck supple. Cardiovascular: Normal rate, regular rhythm, normal heart sounds and intact distal pulses. Pulmonary/Chest: Effort normal and breath sounds normal. No respiratory distress. He has no wheezes. Musculoskeletal: He exhibits no edema. Lymphadenopathy:     He has no cervical adenopathy. Neurological: He is alert and oriented to person, place, and time. Skin: Skin is warm and dry. No rash noted. He is not diaphoretic. No erythema. No pallor. Psychiatric: He has a normal mood and affect. His behavior is normal. Judgment and thought content normal.   Nursing note and vitals reviewed.         Diagnostic Study Results     Labs -   No results found for this or any previous visit (from the past 12 hour(s)). Radiologic Studies -   No orders to display     CT Results  (Last 48 hours)    None        No results found for this or any previous visit (from the past 12 hour(s)). Medical Decision Making   I am the first provider for this patient. I reviewed the vital signs, available nursing notes, past medical history, past surgical history, social history    ED Course and Progress notes:   Initial assessment performed. The patients presenting problems have been discussed, and they are in agreement with the care plan formulated and outlined with them. I have encouraged them to ask questions as they arise throughout their visit. On re evaluation pt is resting comfortably, and has no new complaints, changes, or physical findings. The patient has improved and is stable. Procedures:  Procedures    Critical Care Time: none    Vital Signs-Reviewed the patient's vital signs. Vitals:    10/12/19 1626 10/12/19 1849   BP: (!) 149/92 140/87   BP 1 Location: Right arm    BP Patient Position: At rest    Pulse: 98 84   Resp: 12 16   Temp: 98 °F (36.7 °C)    SpO2: 99% 97%   Weight: 81.2 kg (179 lb)    Height: 6' (1.829 m)        Medications Administered During ED Course  Medications   tetracaine HCl (PF) (PONTOCAINE) 0.5 % ophthalmic solution 2 Drop (2 Drops Right Eye Given 10/12/19 1749)   fluorescein (FUL-MAIE) 1 mg ophthalmic strip 1 Strip (1 Strip Right Eye Given by Provider 10/12/19 1811)     Disposition:  D/c home    DISCHARGE NOTE:   I Counseled the patient on diagnosis and care plan. All available lab and imaging results have been reviewed by me and were discussed with the patient, including all incidental findings. The likelihood of other entities in the differential is insufficient to justify any further testing for them. This was explained to the patient.   Patient agrees with plan and agrees to follow up with PCP as recommended, or return to the ED immediately if their symptoms worsen. All medications were reviewed with the patient. All of pt's questions and concerns were addressed. The patient was advised that new or worsening symptoms would require further evaluation and should prompt immediate return to the Emergency Department. Discharge instructions have been provided and explained to the patient, along with reasons to return to the ED. Patient voices understanding and is agreeable with the plan for discharge. Patient is ready to go home. Follow-up Information     Follow up With Specialties Details Why 1356 [facility]  Schedule an appointment as soon as possible for a visit for corneal abrasion [phone]    [facility] EMERGENCY DEPT Emergency Medicine Go to If symptoms worsen [address] [facility] [address]  1 [facility] Internal Medicine Schedule an appointment as soon as possible for a visit to establish a pcp and follow up for todays visit [address] [facility].  [phone]            Discharge Medication List as of 10/12/2019  6:39 PM      START taking these medications    Details   ciprofloxacin HCl (CILOXAN) 0.3 % ophthalmic solution Apply 1 Drop to eye two (2) times a day for 10 days. , Print, Disp-2.5 mL, R-0         CONTINUE these medications which have CHANGED    Details   ibuprofen (MOTRIN) 800 mg tablet Take 1 Tab by mouth every six (6) hours as needed for Pain for up to 7 days. , Print, Disp-20 Tab, R-0         CONTINUE these medications which have NOT CHANGED    Details   gabapentin (NEURONTIN) 800 mg tablet Take 800 mg by mouth three (3) times daily. , Historical Med      ondansetron (ZOFRAN ODT) 4 mg disintegrating tablet Take 1 Tab by mouth every eight (8) hours as needed for Nausea. , Print, Disp-10 Tab, R-0             Provider Notes (Medical Decision Making):   DDx: Glaucoma, conjunctivitis, corneal abrasion, corneal ulcer, uveitis, iritis, ocular foreign body, keratitis    Diagnosis     Clinical Impression:   1. Abrasion of right cornea, initial encounter    2. Elevated blood pressure reading        Please note that this dictation was completed with [employer], the computer voice recognition software. Quite often unanticipated grammatical, syntax, homophones, and other interpretive errors are inadvertently transcribed by the computer software. Please disregard these errors. Please excuse any errors that have escaped final proofreading. This note will not be viewable in [address]

## 2019-10-14 ENCOUNTER — OFFICE VISIT (OUTPATIENT)
Dept: INTERNAL MEDICINE CLINIC | Age: 49
End: 2019-10-14

## 2019-10-14 VITALS
TEMPERATURE: 98.1 F | WEIGHT: 168 LBS | HEART RATE: 97 BPM | BODY MASS INDEX: 23.52 KG/M2 | SYSTOLIC BLOOD PRESSURE: 134 MMHG | RESPIRATION RATE: 16 BRPM | DIASTOLIC BLOOD PRESSURE: 87 MMHG | HEIGHT: 71 IN | OXYGEN SATURATION: 97 %

## 2019-10-14 DIAGNOSIS — Z23 ENCOUNTER FOR IMMUNIZATION: ICD-10-CM

## 2019-10-14 DIAGNOSIS — E10.42 TYPE 1 DIABETES MELLITUS WITH DIABETIC POLYNEUROPATHY (HCC): Primary | ICD-10-CM

## 2019-10-14 RX ORDER — PEN NEEDLE, DIABETIC 30 GX3/16"
NEEDLE, DISPOSABLE MISCELLANEOUS
Qty: 1 PACKAGE | Refills: 11 | Status: SHIPPED | OUTPATIENT
Start: 2019-10-14 | End: 2021-01-20 | Stop reason: SDUPTHER

## 2019-10-14 RX ORDER — INSULIN GLARGINE 100 [IU]/ML
INJECTION, SOLUTION SUBCUTANEOUS
COMMUNITY
End: 2019-10-14 | Stop reason: SDUPTHER

## 2019-10-14 RX ORDER — INSULIN LISPRO 100 [IU]/ML
12 INJECTION, SOLUTION INTRAVENOUS; SUBCUTANEOUS
Qty: 1 PACKAGE | Refills: 4 | Status: SHIPPED | OUTPATIENT
Start: 2019-10-14 | End: 2019-10-15 | Stop reason: CLARIF

## 2019-10-14 RX ORDER — GABAPENTIN 800 MG/1
800 TABLET ORAL 3 TIMES DAILY
Qty: 90 TAB | Refills: 1 | Status: SHIPPED | OUTPATIENT
Start: 2019-10-14 | End: 2019-12-29

## 2019-10-14 RX ORDER — INSULIN LISPRO 100 [IU]/ML
INJECTION, SOLUTION INTRAVENOUS; SUBCUTANEOUS
COMMUNITY
End: 2019-10-14 | Stop reason: SDUPTHER

## 2019-10-14 RX ORDER — INSULIN GLARGINE 100 [IU]/ML
40 INJECTION, SOLUTION SUBCUTANEOUS
Qty: 5 PEN | Refills: 1 | Status: SHIPPED | OUTPATIENT
Start: 2019-10-14 | End: 2019-10-15 | Stop reason: CLARIF

## 2019-10-14 NOTE — PATIENT INSTRUCTIONS
Office Policies    Phone calls/patient messages:            Please allow up to 24 hours for someone in the office to contact you about your call or message. Be mindful your provider may be out of the office or your message may require further review. We encourage you to use Self-A-r-T for your messages as this is a faster, more efficient way to communicate with our office                         Medication Refills:            Prescription medications require 48-72 business hours to process. We encourage you to use Self-A-r-T for your refills. For controlled medications: Please allow 72 business hours to process. Certain medications may require you to  a written prescription at our office. NO narcotic/controlled medications will be prescribed after 4pm Monday through Friday or on weekends              Form/Paperwork Completion:            Please note a $25 fee may incur for all paperwork for completed by our providers. We ask that you allow 7-10 business days. Pre-payment is due prior to picking up/faxing the completed form. You may also download your forms to Self-A-r-T to have your doctor print off.

## 2019-10-14 NOTE — PROGRESS NOTES
Mr. Antonino Slaughter is a new patient who is here to establish care. CC:  No chief complaint on file. Type 1 diabetes      HPI:    Patient has a history of type 1 diabetes for several years. He has struggled with control of diabetes. His meter recently stopped working. Using in [employer] meter. 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/    - 3 kids  Non smoker  No ETOH       Review of systems:  Constitutional: negative for fever, chills, weight loss, night sweats   Eyes : negative for vision changes, eye pain and discharge  Nose and Throat: negative for tinnitus, sore throat   Cardiovascular: negative for chest pain, palpitations and shortness of breath  Respiratory: negative for shortness of breath, cough and wheezing   Gastroinstestinal: negative for abdominal pain, nausea, vomiting, diarrhea, constipation, and blood in the stool  Musculoskeletal: negative for back ache and joint ache   Genitourinary: negative for dysuria, nocturia, polyuria and hematuria   Neurologic: Negative for focal weakness, numbness or incoordination  Skin: negative for rash, pruritus  Hematologic: negative for easy bruising      Past Medical History:   Diagnosis Date    Diabetes ([address]     Neuropathy         Past Surgical History:   Procedure Laterality Date    HX BACK SURGERY      L5       No Known Allergies    Current Outpatient Medications on File Prior to Visit   Medication Sig Dispense Refill    ciprofloxacin HCl (CILOXAN) 0.3 % ophthalmic solution Apply 1 Drop to eye two (2) times a day for 10 days. 2.5 mL 0    ibuprofen (MOTRIN) 800 mg tablet Take 1 Tab by mouth every six (6) hours as needed for Pain for up to 7 days.  20 Tab 0    ondansetron (ZOFRAN ODT) 4 mg disintegrating tablet Take 1 Tab by mouth every eight (8) hours as needed for Nausea. 10 Tab 0     No current facility-administered medications on file prior to visit. family history includes Cancer in his father; Diabetes in his maternal aunt and mother.     Social History     Socioeconomic History    Marital status:      Spouse name: Not on file    Number of children: Not on file    Years of education: Not on file    Highest education level: Not on file   Occupational History    Not on file   Social Needs    Financial resource strain: Not on file    Food insecurity:     Worry: Not on file     Inability: Not on file    Transportation needs:     Medical: Not on file     Non-medical: Not on file   Tobacco Use    Smoking status: Never Smoker    Smokeless tobacco: Never Used   Substance and Sexual Activity    Alcohol use: Never     Frequency: Never    Drug use: Never    Sexual activity: Not on file   Lifestyle    Physical activity:     Days per week: Not on file     Minutes per session: Not on file    Stress: Not on file   Relationships    Social connections:     Talks on phone: Not on file     Gets together: Not on file     Attends Yazidism service: Not on file     Active member of club or organization: Not on file     Attends meetings of clubs or organizations: Not on file     Relationship status: Not on file    Intimate partner violence:     Fear of current or ex partner: Not on file     Emotionally abused: Not on file     Physically abused: Not on file     Forced sexual activity: Not on file   Other Topics Concern    Not on file   Social History Narrative    Not on file       Visit Vitals  /87 (BP 1 Location: Left arm, BP Patient Position: Sitting)   Pulse 97   Temp 98.1 °F (36.7 °C) (Oral)   Resp 16   Ht 5' 11.25\" (1.81 m)   Wt 168 lb (76.2 kg)   SpO2 97%   BMI 23.27 kg/m²     General:  Well appearing male no acute distress  HEENT:   PERRL,normal conjunctiva. External ear and canals normal, TMs normal.  Hearing normal to voice. Nose without edema or discharge, normal septum. Lips, teeth, gums normal.  Oropharynx: no erythema, no exudates, no lesions, normal tongue. Neck:  Supple. Thyroid normal size, nontender, without nodules. No carotid bruit. No masses or lymphadenopathy  Respiratory: no respiratory distress,  no wheezing, no rhonchi, no rales. No chest wall tenderness. Cardiovascular:  RRR, normal S1S2, no murmur. Gastrointestinal: normal bowel sounds, soft, nontender, without masses. No hepatosplenomegaly. Extremities +2 pulses, no edema, normal sensation   Musculoskeletal:  Normal gait. Normal digits and nails. Normal strength and tone, no atrophy, and no abnormal movement. Skin:  No rash, no lesions, no ulcers. Skin warm, normal turgor, without induration or nodules. Neuro:  A and OX4, fluent speech, cranial nerves normal 2-12. Sensation normal to light touch.   DTR symmetrical  Psych:  Normal affect      Lab Results   Component Value Date/Time    WBC 6.0 09/15/2019 10:39 PM    HGB 11.7 (L) 09/15/2019 10:39 PM    HCT 36.1 (L) 09/15/2019 10:39 PM    PLATELET 590 51/38/3726 10:39 PM    MCV 81.3 09/15/2019 10:39 PM     Lab Results   Component Value Date/Time    Sodium 141 06/16/2019 10:56 PM    Potassium 3.7 06/16/2019 10:56 PM    Chloride 106 06/16/2019 10:56 PM    CO2 31 06/16/2019 10:56 PM    Anion gap 4 (L) 06/16/2019 10:56 PM    Glucose 115 (H) 06/16/2019 10:56 PM    BUN 19 06/16/2019 10:56 PM    Creatinine 1.04 06/16/2019 10:56 PM    BUN/Creatinine ratio 18 06/16/2019 10:56 PM    GFR est AA >60 06/16/2019 10:56 PM    GFR est non-AA >60 06/16/2019 10:56 PM    Calcium 9.0 06/16/2019 10:56 PM     No results found for: CHOL, CHOLPOCT, CHOLX, CHLST, CHOLV, HDL, HDLPOC, HDLP, LDL, LDLCPOC, LDLC, DLDLP, VLDLC, VLDL, TGLX, TRIGL, TRIGP, TGLPOCT, CHHD, CHHDX  No results found for: TSH, TSH2, TSH3, TSHP, TSHEXT, TSHEXT  No results found for: HBA1C, HGBE8, CHP1GGZA, RAF4LXCT, UYB6NMIO  No results found for: KAYLEE Jovita Doctor, VD3RIA                Assessment and Plan:   New patient evaluation     1. Type 1 diabetes mellitus with diabetic polyneuropathy (HCC)  Likely uncontrolled , out of long acting insulin for months.   - insulin glargine (LANTUS SOLOSTAR U-100 INSULIN) 100 unit/mL (3 mL) inpn; 40 Units by SubCUTAneous route nightly. Indications: 24 units at night and 18 units in the morning  Dispense: 5 Pen; Refill: 1  - insulin lispro (HUMALOG) 100 unit/mL kwikpen; 12 Units by SubCUTAneous route Before breakfast, lunch, and dinner. Dispense: 1 Package; Refill: 4  - flash glucose sensor (FREESTYLE TRENTON 14 DAY SENSOR) kit; 1 Each by Does Not Apply route Before breakfast, lunch, and dinner. Dispense: 1 Kit; Refill: 1  - METABOLIC PANEL, COMPREHENSIVE  - LIPID PANEL  - CBC WITH AUTOMATED DIFF  - MICROALBUMIN, UR, RAND W/ MICROALB/CREAT RATIO  - HEMOGLOBIN A1C W/O EAG  - gabapentin (NEURONTIN) 800 mg tablet; Take 1 Tab by mouth three (3) times daily. Max Daily Amount: 2,400 mg. Dispense: 90 Tab; Refill: 1  - has appointment with ophthalmologist    2. Encounter for immunization  - INFLUENZA VIRUS VAC QUAD,SPLIT,PRESV FREE SYRINGE IM  - SC IMMUNIZ ADMIN,1 SINGLE/COMB VAC/TOXOID      Follow-up and Dispositions    · Return in about 4 weeks (around 11/11/2019) for type 1 diabetes .           Suzy Almanza MD

## 2019-10-15 ENCOUNTER — DOCUMENTATION ONLY (OUTPATIENT)
Dept: INTERNAL MEDICINE CLINIC | Age: 49
End: 2019-10-15

## 2019-10-15 ENCOUNTER — TELEPHONE (OUTPATIENT)
Dept: INTERNAL MEDICINE CLINIC | Age: 49
End: 2019-10-15

## 2019-10-15 RX ORDER — INSULIN ASPART 100 [IU]/ML
INJECTION, SOLUTION INTRAVENOUS; SUBCUTANEOUS
Qty: 15 ML | Refills: 1
Start: 2019-10-15 | End: 2020-06-08 | Stop reason: SDUPTHER

## 2019-10-15 RX ORDER — INSULIN DEGLUDEC 100 U/ML
INJECTION, SOLUTION SUBCUTANEOUS
Qty: 15 ML | Refills: 3
Start: 2019-10-15 | End: 2019-12-29

## 2019-10-15 NOTE — TELEPHONE ENCOUNTER
Pharmacy Progress Note - Telephone Encounter    S/O: Mr. Clifford Carter 52 y.o. male, referred by Dr. George Leonard MD, was contacted via an outbound telephone call to discuss his insulin access today. Verified patients identifiers (name & ) per HIPAA policy.     - U3RZ  - Lantus and Humalog are not covered by his current [facility] [employer]. Uses [facility]. - Is completely out of insulin.   - Reports he was able to get Ghotra sensor filled ($37/month). Has hx of using this in the past.    - States he forgot to get labs ordered from yesterday's PCP visit. A/P:  -  Confirmed with [facility]. Marceil Larry and Novolog are preferred formulary alternatives. r coupons for Marceil Larry ($5/month) and Novolog ($25/month) provided. Confirmed copays. - Discussed med changes with patient. - Pt is scheduled for diabetes management on Thursday, 10/24/19  @ 10:30 AM.  Pt plans to get labs drawn on the same day. - Patient endorses understanding to the provided information. All questions were answered at this time.      Thank you for the consult,  Carrie Kohler, PharmD, CDE     Medications Discontinued During This Encounter   Medication Reason    insulin glargine (LANTUS SOLOSTAR U-100 INSULIN) 100 unit/mL (3 mL) inpn Formulary Change    insulin lispro (HUMALOG) 100 unit/mL kwikpen Formulary Change

## 2019-10-15 NOTE — TELEPHONE ENCOUNTER
----- Message from Eduar [place] sent at 10/15/2019 12:05 PM EDT -----  Regarding: Dr. Dori Kuhn first and last name:      Reason for call: Pt stated his insurance will not cover his [facility] Prescription.       Callback required yes/no and why: yes    Best contact number(s): [phone]      Details to clarify the request:      Medardo Strauss

## 2019-10-15 NOTE — TELEPHONE ENCOUNTER
MD Sandi Castro LPN Cc: Cathie Enriquez, PHARMD   Caller: Unspecified (Today, 12:49 PM)             I sent a message to [facility] to give him a sample and help us sort this out.  He is type 1 diabetic and needs insulin      noted

## 2019-10-15 NOTE — PROGRESS NOTES
Following rx was faxed to pharmacy with confirmation received:      gabapentin (NEURONTIN) 800 mg tablet [[id number]]     Order Details   Dose: 800 mg Route: Oral Frequency: 3 TIMES DAILY   Dispense Quantity: 90 Tab Refills: 1 Fills remaining: --           Sig: Take 1 Tab by mouth three (3) times daily.  Max Daily Amount: 2,400 mg.          Written Date: 10/14/19 Expiration Date: --     Start Date: 10/14/19 End Date: --

## 2019-10-24 ENCOUNTER — TELEPHONE (OUTPATIENT)
Dept: INTERNAL MEDICINE CLINIC | Age: 49
End: 2019-10-24

## 2019-10-24 NOTE — TELEPHONE ENCOUNTER
Pharmacy Progress Note - Telephone Encounter    S/O: Mr. Espinoza  y.o. male, referred by Dr. Sil Romero MD, was contacted via an outbound telephone call to discuss to r/s his appt today today. Verified patients identifiers (name & ) per HIPAA policy.     - States his car broke down. - Has yet to  [facility] sensor     A/P:  - Appt rescheduled for  @ 10 AM.  Pt plans to come in early that day to have labs collected. - Remind patient to  [facility] sensor.    - Patient endorses understanding to the provided information. All questions were answered at this time.        Thank you,  My Price, PharmD, CDE

## 2019-11-06 ENCOUNTER — TELEPHONE (OUTPATIENT)
Dept: INTERNAL MEDICINE CLINIC | Age: 49
End: 2019-11-06

## 2019-11-06 NOTE — TELEPHONE ENCOUNTER
Spoke with patients wife Rufus Raman ([facility])  Two pt identifiers confirmed. Rufus Raman states that patient was scheduled to see Dr. Skinny Hawk on 11/07/19 and Sue on 11/12/19. Rufus Raman states that patient will be out of town and needs to reschedule both appointments. Rufus Raman offered an appointment on 12/09/19 with Dr. Skinny Hawk and 11/19/19 with Alexys Kennedy. Both appointments were accepted. Rufus Raman advised if anything changes or if unable to keep this appointment to call the office  Rfuus Raman verbalized understanding of information discussed w/ no further questions at this time.

## 2019-11-06 NOTE — TELEPHONE ENCOUNTER
Aria Man             General Message/Vendor Calls     Caller's first and last name: Rayshawn Redding Pts wife       Reason for call: Pt would need to reschedule his med management appt that was scheduled for 11/07/19 and also the 4 wk f/up appt that was supposed to be on 11/12.    Callback required yes/no and why: to reschedule appt       Best contact number(s): [phone]       Details to clarify the request:     Message received & copied from [facility]

## 2019-12-09 ENCOUNTER — TELEPHONE (OUTPATIENT)
Dept: INTERNAL MEDICINE CLINIC | Age: 49
End: 2019-12-09

## 2019-12-09 NOTE — TELEPHONE ENCOUNTER
Caller's first and last name: Catrachita Zimmerman - wife   Reason for call: Pt's wife is requesting Dr. Cronin Code call in a prescription for the [facility] machine to the pt's pharmacy on file.    Callback required yes/no and why: Yes   Best contact number(s): [phone]   Details to clarify the request:          Copy/paste Ting gomez

## 2019-12-18 ENCOUNTER — TELEPHONE (OUTPATIENT)
Dept: INTERNAL MEDICINE CLINIC | Age: 49
End: 2019-12-18

## 2019-12-18 DIAGNOSIS — E10.42 TYPE 1 DIABETES MELLITUS WITH DIABETIC POLYNEUROPATHY (HCC): Primary | ICD-10-CM

## 2019-12-18 NOTE — TELEPHONE ENCOUNTER
Shruthi calling in regards to PT Rx for [facility]  Needs rx for reader   We only sent over sensor rx    # [phone]

## 2019-12-29 DIAGNOSIS — E10.42 TYPE 1 DIABETES MELLITUS WITH DIABETIC POLYNEUROPATHY (HCC): ICD-10-CM

## 2019-12-29 RX ORDER — INSULIN DEGLUDEC 100 U/ML
INJECTION, SOLUTION SUBCUTANEOUS
Qty: 15 ML | Refills: 3 | Status: SHIPPED | OUTPATIENT
Start: 2019-12-29 | End: 2020-06-08 | Stop reason: SDUPTHER

## 2019-12-29 RX ORDER — GABAPENTIN 800 MG/1
TABLET ORAL
Qty: 90 TAB | Refills: 1 | Status: SHIPPED | OUTPATIENT
Start: 2019-12-29 | End: 2020-02-24

## 2020-01-09 ENCOUNTER — HOSPITAL ENCOUNTER (EMERGENCY)
Age: 50
Discharge: HOME | End: 2020-01-09
Attending: EMERGENCY MEDICINE
Payer: SELF-PAY

## 2020-01-09 ENCOUNTER — APPOINTMENT (OUTPATIENT)
Dept: GENERAL RADIOLOGY | Age: 50
End: 2020-01-09
Attending: NURSE PRACTITIONER
Payer: SELF-PAY

## 2020-01-09 VITALS
TEMPERATURE: 97.7 F | RESPIRATION RATE: 16 BRPM | DIASTOLIC BLOOD PRESSURE: 75 MMHG | OXYGEN SATURATION: 100 % | SYSTOLIC BLOOD PRESSURE: 166 MMHG | HEART RATE: 100 BPM

## 2020-01-09 DIAGNOSIS — S61.012A LACERATION OF LEFT THUMB WITH COMPLICATION, INITIAL ENCOUNTER: Primary | ICD-10-CM

## 2020-01-09 DIAGNOSIS — S61.209A EXTENSOR TENDON LACERATION OF FINGER WITH OPEN WOUND, INITIAL ENCOUNTER: ICD-10-CM

## 2020-01-09 DIAGNOSIS — S56.429A EXTENSOR TENDON LACERATION OF FINGER WITH OPEN WOUND, INITIAL ENCOUNTER: ICD-10-CM

## 2020-01-09 PROCEDURE — 75810000293 HC SIMP/SUPERF WND  RPR

## 2020-01-09 PROCEDURE — 90471 IMMUNIZATION ADMIN: CPT

## 2020-01-09 PROCEDURE — 74011250636 HC RX REV CODE- 250/636: Performed by: NURSE PRACTITIONER

## 2020-01-09 PROCEDURE — 74011000250 HC RX REV CODE- 250: Performed by: NURSE PRACTITIONER

## 2020-01-09 PROCEDURE — 99283 EMERGENCY DEPT VISIT LOW MDM: CPT

## 2020-01-09 PROCEDURE — 90715 TDAP VACCINE 7 YRS/> IM: CPT | Performed by: NURSE PRACTITIONER

## 2020-01-09 PROCEDURE — 75810000053 HC SPLINT APPLICATION

## 2020-01-09 PROCEDURE — 96372 THER/PROPH/DIAG INJ SC/IM: CPT

## 2020-01-09 PROCEDURE — 73140 X-RAY EXAM OF FINGER(S): CPT

## 2020-01-09 RX ORDER — HYDROCODONE BITARTRATE AND ACETAMINOPHEN 5; 325 MG/1; MG/1
1 TABLET ORAL
Qty: 10 TAB | Refills: 0 | Status: SHIPPED | OUTPATIENT
Start: 2020-01-09 | End: 2020-01-11

## 2020-01-09 RX ORDER — LIDOCAINE HYDROCHLORIDE 10 MG/ML
20 INJECTION, SOLUTION EPIDURAL; INFILTRATION; INTRACAUDAL; PERINEURAL ONCE
Status: COMPLETED | OUTPATIENT
Start: 2020-01-09 | End: 2020-01-09

## 2020-01-09 RX ADMIN — TETANUS TOXOID, REDUCED DIPHTHERIA TOXOID AND ACELLULAR PERTUSSIS VACCINE, ADSORBED 0.5 ML: 5; 2.5; 8; 8; 2.5 SUSPENSION INTRAMUSCULAR at 12:10

## 2020-01-09 RX ADMIN — LIDOCAINE HYDROCHLORIDE 10 ML: 10 INJECTION, SOLUTION EPIDURAL; INFILTRATION; INTRACAUDAL; PERINEURAL at 12:49

## 2020-01-09 NOTE — LETTER
NOTIFICATION RETURN TO WORK / SCHOOL 
 
1/9/2020 2:11 PM 
 
Mr. Devin Jenkins [address] [address] To Whom It May Concern: 
 
Devin Jenkins is currently under the care of [facility]. He will return to work/school on: After you are cleared by orthopedics unless there is something you can do where you do not use your left hand. If there are questions or concerns please have the patient contact our office. Sincerely, Ata Ludwig NP

## 2020-01-09 NOTE — ED NOTES
Discharge instructions given to patient by NP. Pt has been given counseling and verbalizes understanding. Pt ambulated off of unit in no signs of distress.

## 2020-01-09 NOTE — ED PROVIDER NOTES
Patient is a 42-year-old male, left-hand dominant, with a history of diabetes and neuropathy who presents today with a laceration to the left thumb on a . Denies any new numbness to the distal part of his thumb. Unable to extend his distal phalanx. Unsure of his last tetanus. No other acute complaints.            Past Medical History:   Diagnosis Date    Diabetes ([address]     Neuropathy        Past Surgical History:   Procedure Laterality Date    HX BACK SURGERY      L5         Family History:   Problem Relation Age of Onset    Diabetes Mother     Cancer Father     Diabetes Maternal Aunt        Social History     Socioeconomic History    Marital status:      Spouse name: Not on file    Number of children: Not on file    Years of education: Not on file    Highest education level: Not on file   Occupational History    Not on file   Social Needs    Financial resource strain: Not on file    Food insecurity:     Worry: Not on file     Inability: Not on file    Transportation needs:     Medical: Not on file     Non-medical: Not on file   Tobacco Use    Smoking status: Never Smoker    Smokeless tobacco: Never Used   Substance and Sexual Activity    Alcohol use: Never     Frequency: Never    Drug use: Never    Sexual activity: Not on file   Lifestyle    Physical activity:     Days per week: Not on file     Minutes per session: Not on file    Stress: Not on file   Relationships    Social connections:     Talks on phone: Not on file     Gets together: Not on file     Attends Amish service: Not on file     Active member of club or organization: Not on file     Attends meetings of clubs or organizations: Not on file     Relationship status: Not on file    Intimate partner violence:     Fear of current or ex partner: Not on file     Emotionally abused: Not on file     Physically abused: Not on file     Forced sexual activity: Not on file   Other Topics Concern    Not on file   Social History Narrative    Not on file         ALLERGIES: Patient has no known allergies. Review of Systems   Skin: Positive for wound. All other systems reviewed and are negative. Vitals:    01/09/20 1104   BP: 166/75   Pulse: 100   Resp: 16   Temp: 97.7 °F (36.5 °C)   SpO2: 100%            Physical Exam  Vitals signs and nursing note reviewed. Constitutional:       Appearance: Normal appearance. Skin:     General: Skin is warm. Capillary Refill: Capillary refill takes less than 2 seconds. Comments: 3.5 cm laceration, deep, to the dorsal aspect of the left thumb. Unable to extend the distal phalanx. Direct visualization shows laceration to the extensor tendon. Unable to visualize the proximal aspect of the tendon. Gross sensation is intact distally. He does have a baseline sensory deficit due to his neuropathy. Neurological:      Mental Status: He is alert and oriented to person, place, and time. Psychiatric:         Mood and Affect: Mood normal.         Behavior: Behavior normal.          MDM  Number of Diagnoses or Management Options  Diagnosis management comments: Patient is a 70-year-old male, left-hand dominant, who presents with a deep laceration to the left thumb with extensor tendon complete laceration. Consulted Dr. Zoe Polanco and Jd LINTON from orthopedics who recommended loose closure and thumb spica. They will follow-up the patient. Extra chairs were placed to control bleeding. X-ray showed no bony injury. Will discharge home to follow-up with orthopedics. Wound Repair  Date/Time: 1/9/2020 1:26 PM  Performed by: NPPreparation: skin prepped with ChloraPrep  Pre-procedure re-eval: Immediately prior to the procedure, the patient was reevaluated and found suitable for the planned procedure and any planned medications.   Location details: left thumb  Wound length:2.6 - 7.5 cm  Anesthesia: digital block    Anesthesia:  Local Anesthetic: lidocaine 1% without epinephrine  Anesthetic total: 5 mL  Foreign bodies: no foreign bodies  Irrigation solution: saline  Irrigation method: jet lavage  Debridement: none  Skin closure: Prolene  Number of sutures: 6  Technique: simple  Approximation: loose  Dressing: antibiotic ointment, 4x4 and splint  Patient tolerance: Patient tolerated the procedure well with no immediate complications  My total time at bedside, performing this procedure was 16-30 minutes. I was personally available for consultation in the emergency department. I have reviewed the chart and agree with the documentation recorded by the [facility], including the assessment, treatment plan, and disposition.   Babs Moore MD

## 2020-01-15 ENCOUNTER — TELEPHONE (OUTPATIENT)
Dept: INTERNAL MEDICINE CLINIC | Age: 50
End: 2020-01-15

## 2020-01-15 NOTE — TELEPHONE ENCOUNTER
Patient has to reschedule appt due to provider's vacation. She wants something sooner than April which was the next available.

## 2020-01-15 NOTE — TELEPHONE ENCOUNTER
Spoke with patient. Two pt identifiers confirmed. Patient offered an appointment with Dr. Collins Mccann on 01/24/2020. Appointment accepted. Patient advised if anything changes or if unable to keep this appointment to call the office  Pt verbalized understanding of information discussed w/ no further questions at this time.

## 2020-01-20 NOTE — H&P
CARE TEAM:  Patient Care Team:  Jared Willson MD as PCP - General (Internal Medicine)     ASSESSMENT:  1. Extensor tendon laceration of left hand with open wound, initial encounter          There is no problem list on file for this patient.        PLAN:  Treatment Plan:  He has laceration of the EPL tendon. I've recommended exploration of his wound with tendon repair. We discussed reasonable expectations. We discussed usual postoperative course. He has given informed consent to proceed. He will be out of work until surgery.         Orders Placed This Encounter    HYDROcodone-acetaminophen ([facility] [facility]) 5-325 MG per tablet      Follow-up: Return for first postoperative visit.      HISTORY OF PRESENT ILLNESS:  Chief Complaint: Pain and Injury of the Left Hand     Age: 52 y.o. Sex: male   History of present illness: Sherin Ralph a pleasant 52 y.o. male who presents today for evaluation of his left hand. He is left-hand dominant. He was at work. He was cutting a zip tie with a knife. The knife slipped. He lacerated the dorsal aspect of the thumb. He was seen in the ER. Had his wound irrigated and sutured closed. He was told he had a tendon laceration. Pain is reasonably well controlled.     Past medical history, past surgical history, medications, allergies, social history, and review of systems have been reviewed by me. No current facility-administered medications on file prior to encounter. Current Outpatient Medications on File Prior to Encounter   Medication Sig Dispense Refill    insulin degludec (TRESIBA FLEXTOUCH U-100) 100 unit/mL (3 mL) inpn INJECT 40 UNITS UNDER THE SKIN ONCE DAILY 15 mL 3    gabapentin (NEURONTIN) 800 mg tablet TAKE 1 TABLET BY MOUTH THREE TIMES DAILY 90 Tab 1    flash glucose scanning reader (FREESTYLE TRENTON 14 DAY READER) misc 1 Each by Does Not Apply route every fourteen (14) days.  1 Each 2    flash glucose sensor (FREESTYLE TRENTON 14 DAY SENSOR) kit 1 Each by Does Not Apply route Once every 2 weeks. 1 Kit 4    insulin aspart U-100 (NOVOLOG) 100 unit/mL (3 mL) inpn Inject 12 units under the skin before each meal three times daily. 15 mL 1    flash glucose sensor (FREESTYLE TRENTON 14 DAY SENSOR) kit 1 Each by Does Not Apply route Before breakfast, lunch, and dinner. 1 Kit 1    Insulin Needles, Disposable, 31 gauge x 5/16\" ndle  100 units 1 Package 11    ondansetron (ZOFRAN ODT) 4 mg disintegrating tablet Take 1 Tab by mouth every eight (8) hours as needed for Nausea.  10 Tab 0       Past Medical History:   Diagnosis Date    Diabetes [address] [address]     Neuropathy        No Known Allergies    Social History     Socioeconomic History    Marital status:      Spouse name: Not on file    Number of children: Not on file    Years of education: Not on file    Highest education level: Not on file   Occupational History    Not on file   Social Needs    Financial resource strain: Not on file    Food insecurity:     Worry: Not on file     Inability: Not on file    Transportation needs:     Medical: Not on file     Non-medical: Not on file   Tobacco Use    Smoking status: Never Smoker    Smokeless tobacco: Never Used   Substance and Sexual Activity    Alcohol use: Never     Frequency: Never    Drug use: Never    Sexual activity: Not on file   Lifestyle    Physical activity:     Days per week: Not on file     Minutes per session: Not on file    Stress: Not on file   Relationships    Social connections:     Talks on phone: Not on file     Gets together: Not on file     Attends Hindu service: Not on file     Active member of club or organization: Not on file     Attends meetings of clubs or organizations: Not on file     Relationship status: Not on file    Intimate partner violence:     Fear of current or ex partner: Not on file     Emotionally abused: Not on file     Physically abused: Not on file     Forced sexual activity: Not on file   Other Topics Concern    Not on file   Social History Narrative    Not on file          Review of Systems   1/13/2020     Constitutional: Unexplained: Negative  Genitourinary: Frequent Urination: Positive  HEENT: Vision Loss: Negative  Neurological: Memory Loss: Negative  Integumentary: Rash: Negative  Cardiovascular: Palpatations: Negative  Hematologic: Bruises/Bleeds Easily: Negative  Gastrointestinal: Constipation: Negative  Immunological: Seasonal Allergies: Negative  Musculoskeletal: Joint Pain: Positive        OBJECTIVE:  Constitutional:  No acute distress. Pleasant and cooperative with exam.  HEENT: Normocephalic. Atraumatic. Eyes are clear  Hearing intact to spoken word   Respiratory:  Breathing unlabored. .  Cardiovascular:  No marked edema. Psychiatric: Alert.  Affect appropriate. Gait: Normal.  Musculoskeletal    He has an oblique laceration on the dorsal aspect of the thumb overlying the proximal phalanx. This is clean and dry. Mild erythema. No drainage. He has absent EPL function. No thumb retropulsion. Intact sensibility.   Brisk cap refill.     IMAGING / STUDIES:           No imaging obtained

## 2020-01-22 ENCOUNTER — ANESTHESIA EVENT (OUTPATIENT)
Dept: MEDSURG UNIT | Age: 50
End: 2020-01-22
Payer: OTHER MISCELLANEOUS

## 2020-01-22 ENCOUNTER — ANESTHESIA (OUTPATIENT)
Dept: MEDSURG UNIT | Age: 50
End: 2020-01-22
Payer: OTHER MISCELLANEOUS

## 2020-01-22 ENCOUNTER — HOSPITAL ENCOUNTER (OUTPATIENT)
Age: 50
Setting detail: OUTPATIENT SURGERY
Discharge: HOME | End: 2020-01-22
Attending: ORTHOPAEDIC SURGERY | Admitting: ORTHOPAEDIC SURGERY
Payer: OTHER MISCELLANEOUS

## 2020-01-22 VITALS
TEMPERATURE: 97.6 F | HEART RATE: 80 BPM | DIASTOLIC BLOOD PRESSURE: 85 MMHG | SYSTOLIC BLOOD PRESSURE: 137 MMHG | WEIGHT: 171.52 LBS | BODY MASS INDEX: 23.75 KG/M2 | OXYGEN SATURATION: 100 % | RESPIRATION RATE: 11 BRPM

## 2020-01-22 DIAGNOSIS — S61.012A LACERATION OF LEFT THUMB WITH TENDON INVOLVEMENT, INITIAL ENCOUNTER: Primary | ICD-10-CM

## 2020-01-22 LAB
GLUCOSE BLD STRIP.AUTO-MCNC: 102 MG/DL (ref 65–100)
GLUCOSE BLD STRIP.AUTO-MCNC: 51 MG/DL (ref 65–100)
GLUCOSE BLD STRIP.AUTO-MCNC: 53 MG/DL (ref 65–100)
GLUCOSE BLD STRIP.AUTO-MCNC: 62 MG/DL (ref 65–100)
GLUCOSE BLD STRIP.AUTO-MCNC: 93 MG/DL (ref 65–100)
SERVICE CMNT-IMP: ABNORMAL
SERVICE CMNT-IMP: NORMAL

## 2020-01-22 PROCEDURE — 74011250636 HC RX REV CODE- 250/636: Performed by: ANESTHESIOLOGY

## 2020-01-22 PROCEDURE — 74011250636 HC RX REV CODE- 250/636: Performed by: NURSE ANESTHETIST, CERTIFIED REGISTERED

## 2020-01-22 PROCEDURE — 77030040356 HC CORD BPLR FRCP COVD -A: Performed by: ORTHOPAEDIC SURGERY

## 2020-01-22 PROCEDURE — 77030003601 HC NDL NRV BLK BBMI -A

## 2020-01-22 PROCEDURE — 74011000258 HC RX REV CODE- 258: Performed by: ANESTHESIOLOGY

## 2020-01-22 PROCEDURE — 77030040922 HC BLNKT HYPOTHRM STRY -A

## 2020-01-22 PROCEDURE — 77030002916 HC SUT ETHLN J&J -A: Performed by: ORTHOPAEDIC SURGERY

## 2020-01-22 PROCEDURE — 76210000035 HC AMBSU PH I REC 1 TO 1.5 HR: Performed by: ORTHOPAEDIC SURGERY

## 2020-01-22 PROCEDURE — 76060000061 HC AMB SURG ANES 0.5 TO 1 HR: Performed by: ORTHOPAEDIC SURGERY

## 2020-01-22 PROCEDURE — 77030002917 HC SUT ETHLN J&J -B: Performed by: ORTHOPAEDIC SURGERY

## 2020-01-22 PROCEDURE — 77030018836 HC SOL IRR NACL ICUM -A: Performed by: ORTHOPAEDIC SURGERY

## 2020-01-22 PROCEDURE — 77030002912 HC SUT ETHBND J&J -A: Performed by: ORTHOPAEDIC SURGERY

## 2020-01-22 PROCEDURE — 77030010936 HC CLP LIG BSC -C: Performed by: ORTHOPAEDIC SURGERY

## 2020-01-22 PROCEDURE — 76030000000 HC AMB SURG OR TIME 0.5 TO 1: Performed by: ORTHOPAEDIC SURGERY

## 2020-01-22 PROCEDURE — 82962 GLUCOSE BLOOD TEST: CPT

## 2020-01-22 RX ORDER — PROPOFOL 10 MG/ML
INJECTION, EMULSION INTRAVENOUS AS NEEDED
Status: DISCONTINUED | OUTPATIENT
Start: 2020-01-22 | End: 2020-01-22 | Stop reason: HOSPADM

## 2020-01-22 RX ORDER — SODIUM CHLORIDE 0.9 % (FLUSH) 0.9 %
5-40 SYRINGE (ML) INJECTION EVERY 8 HOURS
Status: DISCONTINUED | OUTPATIENT
Start: 2020-01-22 | End: 2020-01-22 | Stop reason: HOSPADM

## 2020-01-22 RX ORDER — SODIUM CHLORIDE, SODIUM LACTATE, POTASSIUM CHLORIDE, CALCIUM CHLORIDE 600; 310; 30; 20 MG/100ML; MG/100ML; MG/100ML; MG/100ML
75 INJECTION, SOLUTION INTRAVENOUS CONTINUOUS
Status: DISCONTINUED | OUTPATIENT
Start: 2020-01-22 | End: 2020-01-22 | Stop reason: HOSPADM

## 2020-01-22 RX ORDER — FENTANYL CITRATE 50 UG/ML
25 INJECTION, SOLUTION INTRAMUSCULAR; INTRAVENOUS
Status: CANCELLED | OUTPATIENT
Start: 2020-01-22

## 2020-01-22 RX ORDER — SODIUM CHLORIDE 0.9 % (FLUSH) 0.9 %
5-40 SYRINGE (ML) INJECTION AS NEEDED
Status: DISCONTINUED | OUTPATIENT
Start: 2020-01-22 | End: 2020-01-22 | Stop reason: HOSPADM

## 2020-01-22 RX ORDER — OXYCODONE AND ACETAMINOPHEN 5; 325 MG/1; MG/1
1 TABLET ORAL AS NEEDED
Status: CANCELLED | OUTPATIENT
Start: 2020-01-22

## 2020-01-22 RX ORDER — ONDANSETRON 2 MG/ML
4 INJECTION INTRAMUSCULAR; INTRAVENOUS AS NEEDED
Status: CANCELLED | OUTPATIENT
Start: 2020-01-22

## 2020-01-22 RX ORDER — DEXTROSE 50 % IN WATER (D50W) INTRAVENOUS SYRINGE
25 ONCE
Status: COMPLETED | OUTPATIENT
Start: 2020-01-22 | End: 2020-01-22

## 2020-01-22 RX ORDER — MIDAZOLAM HYDROCHLORIDE 1 MG/ML
0.5 INJECTION, SOLUTION INTRAMUSCULAR; INTRAVENOUS
Status: CANCELLED | OUTPATIENT
Start: 2020-01-22

## 2020-01-22 RX ORDER — FENTANYL CITRATE 50 UG/ML
50 INJECTION, SOLUTION INTRAMUSCULAR; INTRAVENOUS AS NEEDED
Status: COMPLETED | OUTPATIENT
Start: 2020-01-22 | End: 2020-01-22

## 2020-01-22 RX ORDER — PROPOFOL 10 MG/ML
INJECTION, EMULSION INTRAVENOUS
Status: DISCONTINUED | OUTPATIENT
Start: 2020-01-22 | End: 2020-01-22 | Stop reason: HOSPADM

## 2020-01-22 RX ORDER — HYDROMORPHONE HYDROCHLORIDE 1 MG/ML
0.2 INJECTION, SOLUTION INTRAMUSCULAR; INTRAVENOUS; SUBCUTANEOUS
Status: CANCELLED | OUTPATIENT
Start: 2020-01-22

## 2020-01-22 RX ORDER — SODIUM CHLORIDE 9 MG/ML
50 INJECTION, SOLUTION INTRAVENOUS CONTINUOUS
Status: CANCELLED | OUTPATIENT
Start: 2020-01-22

## 2020-01-22 RX ORDER — MIDAZOLAM HYDROCHLORIDE 1 MG/ML
1 INJECTION, SOLUTION INTRAMUSCULAR; INTRAVENOUS AS NEEDED
Status: DISCONTINUED | OUTPATIENT
Start: 2020-01-22 | End: 2020-01-22 | Stop reason: HOSPADM

## 2020-01-22 RX ORDER — SODIUM CHLORIDE, SODIUM LACTATE, POTASSIUM CHLORIDE, CALCIUM CHLORIDE 600; 310; 30; 20 MG/100ML; MG/100ML; MG/100ML; MG/100ML
75 INJECTION, SOLUTION INTRAVENOUS CONTINUOUS
Status: CANCELLED | OUTPATIENT
Start: 2020-01-22

## 2020-01-22 RX ORDER — DIPHENHYDRAMINE HYDROCHLORIDE 50 MG/ML
12.5 INJECTION, SOLUTION INTRAMUSCULAR; INTRAVENOUS AS NEEDED
Status: CANCELLED | OUTPATIENT
Start: 2020-01-22 | End: 2020-01-22

## 2020-01-22 RX ORDER — SODIUM CHLORIDE 0.9 % (FLUSH) 0.9 %
5-40 SYRINGE (ML) INJECTION EVERY 8 HOURS
Status: CANCELLED | OUTPATIENT
Start: 2020-01-22

## 2020-01-22 RX ORDER — CEFAZOLIN SODIUM 1 G/3ML
INJECTION, POWDER, FOR SOLUTION INTRAMUSCULAR; INTRAVENOUS AS NEEDED
Status: DISCONTINUED | OUTPATIENT
Start: 2020-01-22 | End: 2020-01-22 | Stop reason: HOSPADM

## 2020-01-22 RX ORDER — CEFAZOLIN SODIUM/WATER 2 G/20 ML
2 SYRINGE (ML) INTRAVENOUS ONCE
Status: DISCONTINUED | OUTPATIENT
Start: 2020-01-22 | End: 2020-01-22 | Stop reason: HOSPADM

## 2020-01-22 RX ORDER — CEPHALEXIN 250 MG/1
500 CAPSULE ORAL 4 TIMES DAILY
Qty: 12 CAP | Refills: 0 | Status: SHIPPED | OUTPATIENT
Start: 2020-01-22 | End: 2020-06-08 | Stop reason: ALTCHOICE

## 2020-01-22 RX ORDER — OXYCODONE AND ACETAMINOPHEN 10; 325 MG/1; MG/1
1 TABLET ORAL
Qty: 24 TAB | Refills: 0 | Status: SHIPPED | OUTPATIENT
Start: 2020-01-22 | End: 2020-01-26

## 2020-01-22 RX ORDER — SODIUM CHLORIDE 9 MG/ML
50 INJECTION, SOLUTION INTRAVENOUS CONTINUOUS
Status: DISCONTINUED | OUTPATIENT
Start: 2020-01-22 | End: 2020-01-22 | Stop reason: HOSPADM

## 2020-01-22 RX ORDER — LIDOCAINE HYDROCHLORIDE 10 MG/ML
0.1 INJECTION, SOLUTION EPIDURAL; INFILTRATION; INTRACAUDAL; PERINEURAL AS NEEDED
Status: DISCONTINUED | OUTPATIENT
Start: 2020-01-22 | End: 2020-01-22 | Stop reason: HOSPADM

## 2020-01-22 RX ORDER — ROPIVACAINE HYDROCHLORIDE 5 MG/ML
INJECTION, SOLUTION EPIDURAL; INFILTRATION; PERINEURAL
Status: COMPLETED | OUTPATIENT
Start: 2020-01-22 | End: 2020-01-22

## 2020-01-22 RX ORDER — MORPHINE SULFATE 10 MG/ML
2 INJECTION, SOLUTION INTRAMUSCULAR; INTRAVENOUS
Status: CANCELLED | OUTPATIENT
Start: 2020-01-22

## 2020-01-22 RX ORDER — SODIUM CHLORIDE 0.9 % (FLUSH) 0.9 %
5-40 SYRINGE (ML) INJECTION AS NEEDED
Status: CANCELLED | OUTPATIENT
Start: 2020-01-22

## 2020-01-22 RX ADMIN — PROPOFOL 30 MG: 10 INJECTION, EMULSION INTRAVENOUS at 12:33

## 2020-01-22 RX ADMIN — PROPOFOL 65 MCG/KG/MIN: 10 INJECTION, EMULSION INTRAVENOUS at 12:30

## 2020-01-22 RX ADMIN — DEXTROSE MONOHYDRATE 12.5 G: 25 INJECTION, SOLUTION INTRAVENOUS at 10:55

## 2020-01-22 RX ADMIN — FENTANYL CITRATE 50 MCG: 50 INJECTION, SOLUTION INTRAMUSCULAR; INTRAVENOUS at 11:07

## 2020-01-22 RX ADMIN — FENTANYL CITRATE 50 MCG: 50 INJECTION, SOLUTION INTRAMUSCULAR; INTRAVENOUS at 11:10

## 2020-01-22 RX ADMIN — MIDAZOLAM 1 MG: 1 INJECTION INTRAMUSCULAR; INTRAVENOUS at 11:08

## 2020-01-22 RX ADMIN — MEPIVACAINE HYDROCHLORIDE 20 ML: 15 INJECTION, SOLUTION EPIDURAL; INFILTRATION at 11:20

## 2020-01-22 RX ADMIN — ROPIVACAINE HYDROCHLORIDE 10 ML: 5 INJECTION, SOLUTION EPIDURAL; INFILTRATION; PERINEURAL at 11:20

## 2020-01-22 RX ADMIN — CEFAZOLIN 2 G: 330 INJECTION, POWDER, FOR SOLUTION INTRAMUSCULAR; INTRAVENOUS at 12:32

## 2020-01-22 RX ADMIN — SODIUM CHLORIDE, POTASSIUM CHLORIDE, SODIUM LACTATE AND CALCIUM CHLORIDE: 600; 310; 30; 20 INJECTION, SOLUTION INTRAVENOUS at 12:14

## 2020-01-22 RX ADMIN — PROPOFOL 50 MG: 10 INJECTION, EMULSION INTRAVENOUS at 12:30

## 2020-01-22 RX ADMIN — MIDAZOLAM 2 MG: 1 INJECTION INTRAMUSCULAR; INTRAVENOUS at 11:07

## 2020-01-22 NOTE — BRIEF OP NOTE
BRIEF OPERATIVE NOTE    Date of Procedure: 1/22/2020   Preoperative Diagnosis: LEFT THUMB EXTENSOR TENDON LACERATION  Postoperative Diagnosis: LEFT THUMB EXTENSOR TENDON LACERATION    Procedure(s):  REPAIR LEFT THUMB EXTENSOR TENDON  Surgeon(s) and Role:     * Lina Gil MD - Primary         Surgical Assistant: none    Surgical Staff:  Circ-1: Ron Carbajal RN  Scrub RN-1: Alec Israel RN  Event Time In Time Out   Incision Start 1237    Incision Close 1247      Anesthesia: Regional   Estimated Blood Loss: min  Specimens: * No specimens in log *   Findings: lacerated left EPL   Complications: none  Implants:   Implant Name Type Inv.  Item Serial No.  Lot No. LRB No. Used Action   C-WIRE .045&quot;   [employer] [id number] 1 Implanted

## 2020-01-22 NOTE — INTERVAL H&P NOTE
H&P Update:  Rosibel Nolan was seen and examined. History and physical has been reviewed. The patient has been examined.  There have been no significant clinical changes since the completion of the originally dated History and Physical.

## 2020-01-22 NOTE — DISCHARGE INSTRUCTIONS
Dr. Verito Horton Upper Extremity Postoperative Instructions      1. Please maintain the dressing and /or splint placed at surgery until your follow up appointment where it will be removed. Please keep the dressing clean and dry. If you have any questions or problems, please call our office at [phone]. 2. Please elevate the operative extremity to the level of the heart to keep swelling at a minimum. You may get up to move around, but when seated please keep the extremity elevated as much as possible. This will decrease swelling and pain. 3. You were told to be non-weight bearing following surgery. Please do not lift anything heavier than 1 lb with your operative hand. 4. You may ice your Arm/Hand 5 times a day for 20 minutes at a time. 5. You had a block from the Anesthesiologist before surgery. Expect this to wear off around 12-24 hours after you received it. You should start taking your pain medication before the feeling begins to come back into your arm/ hand. 6. A prescription for pain medication is provided. The key to pain control is staying ahead of the pain. For the first 48-72 hours after your surgery, you may want to take your pain medication on a regular schedule. After that, you may only need it on an as needed basis. 7. If you experience any redness, increased pain, increased swelling not relieved by elevation, drainage, fever, or chills, please call the office at [phone], and speak with Evelin Thompson or Kristen Robins. Please Follow-up at my office [address], [address] in 2 weeks unless otherwise directed.

## 2020-01-22 NOTE — ANESTHESIA POSTPROCEDURE EVALUATION
Procedure(s):  REPAIR LEFT THUMB EXTENSOR TENDON. regional, general - backup    Anesthesia Post Evaluation        Patient location during evaluation: PACU  Patient participation: complete - patient participated  Level of consciousness: awake and alert  Pain management: adequate  Airway patency: patent  Anesthetic complications: no  Cardiovascular status: acceptable  Respiratory status: acceptable  Hydration status: acceptable  Comments: Seen, no complaints, pending transfer  Post anesthesia nausea and vomiting:  none      Vitals Value Taken Time   /77 1/22/2020  1:15 PM   Temp 36.4 °C (97.6 °F) 1/22/2020  1:14 PM   Pulse 83 1/22/2020  1:20 PM   Resp 15 1/22/2020  1:20 PM   SpO2 98 % 1/22/2020  1:20 PM   Vitals shown include unvalidated device data.

## 2020-01-22 NOTE — ANESTHESIA PROCEDURE NOTES
Peripheral Block    Performed by: Raysa Venegas DO  Authorized by: Raysa Venegas DO       Pre-procedure:    Indications: at surgeon's request, post-op pain management and primary anesthetic    Preanesthetic Checklist: patient identified, risks and benefits discussed, site marked, timeout performed, anesthesia consent given and patient being monitored      Block Type:   Block Type:  Supraclavicular  Laterality:  Left  Monitoring:  Standard ASA monitoring, continuous pulse ox, frequent vital sign checks, heart rate, responsive to questions and oxygen  Injection Technique:  Single shot  Procedures: ultrasound guided    Patient Position: supine  Prep: chlorhexidine    Location:  Supraclavicular  Needle Type:  Stimuplex  Needle Gauge:  22 G  Needle Localization:  Nerve stimulator and ultrasound guidance  Motor Response: minimal motor response >0.4 mA      Assessment:  Number of attempts:  1  Injection Assessment:  Incremental injection every 5 mL, local visualized surrounding nerve on ultrasound, negative aspiration for blood, no intravascular symptoms, negative aspiration for CSF, no paresthesia and ultrasound image on chart  Patient tolerance:  Patient tolerated the procedure well with no immediate complications

## 2020-01-22 NOTE — ANESTHESIA PREPROCEDURE EVALUATION
Relevant Problems   No relevant active problems       Anesthetic History   No history of anesthetic complications            Review of Systems / Medical History  Patient summary reviewed, nursing notes reviewed and pertinent labs reviewed    Pulmonary  Within defined limits                 Neuro/Psych             Comments: Neuropathy (G62.9) Cardiovascular  Within defined limits                Exercise tolerance: >4 METS     GI/Hepatic/Renal  Within defined limits              Endo/Other    Diabetes         Other Findings              Physical Exam    Airway  Mallampati: II  TM Distance: > 6 cm  Neck ROM: normal range of motion   Mouth opening: Normal     Cardiovascular  Regular rate and rhythm,  S1 and S2 normal,  no murmur, click, rub, or gallop  Rhythm: regular  Rate: normal         Dental         Pulmonary  Breath sounds clear to auscultation               Abdominal  GI exam deferred       Other Findings            Anesthetic Plan    ASA: 2  Anesthesia type: regional and general - backup - supraclavicular block          Induction: Intravenous  Anesthetic plan and risks discussed with: Patient

## 2020-01-22 NOTE — ROUTINE PROCESS
Patient: Leila Barger MRN: [id number]  SSN: [id number]   YOB: 1970  Age: 52 y.o. Sex: male     Patient is status post Procedure(s):  REPAIR LEFT THUMB EXTENSOR TENDON. Surgeon(s) and Role:     * Sreedhar Owen MD - Primary    Local/Dose/Irrigation:  SEE MAR                  Peripheral IV 01/22/20 Right Hand (Active)   Site Assessment Clean, dry, & intact 1/22/2020 12:37 PM   Phlebitis Assessment 0 1/22/2020 12:37 PM   Dressing Status Clean, dry, & intact 1/22/2020 12:37 PM   Dressing Type Transparent 1/22/2020 12:37 PM   Hub Color/Line Status Pink; Infusing 1/22/2020 12:37 PM            Airway - Endotracheal Tube 01/22/20 (Active)                   Dressing/Packing:  Wound Hand Left Thumb-Dressing Type: 4 x 4;Cast padding;Elastic bandage; Xeroform (01/22/20 1246)    Splint/Cast: Wound Hand Left Thumb-Splint Type/Material: Splint, plaster]    Other:

## 2020-01-23 NOTE — OP NOTES
[address]   OPERATIVE REPORT    Name:  Atul Herbert  MR#:  [id number]  :  1970  ACCOUNT #:  [de-identified]  DATE OF SERVICE:  2020    PREOPERATIVE DIAGNOSIS:  Left extensor pollicis longus tendon laceration. POSTOPERATIVE DIAGNOSIS:  Left extensor pollicis longus tendon laceration. PROCEDURE PERFORMED:  Repair of left extensor pollicis longus laceration with pinning of distal interphalangeal joint. SURGEON:  Jesús Price MD    ASSISTANT: None    ANESTHESIA:  Regional.    COMPLICATIONS:  None. SPECIMENS REMOVED:  None. IMPLANTS:  A 0.045 K-wire. ESTIMATED BLOOD LOSS:  Minimal.    INDICATIONS FOR PROCEDURE:  This is a 27-year-old male who lacerated the left thumb extensor tendon at work. He is indicated for exploration of his wound with tendon repair. We have discussed risks, benefits, potential complications and alternatives of surgery and he has given informed content to proceed. DESCRIPTION OF PROCEDURE:  The patient was identified in the preoperative holding area. Informed consent was obtained. The operative site was marked. Regional anesthesia was then established. He was then transported to the OR and placed supine on the operating table. All bony prominences were well padded. A tourniquet was applied to the upper brachium. After induction of light sedation, the left upper extremity was sterilely prepped and draped in usual fashion. Surgical time-out was held. The operative site was confirmed. Preoperative antibiotics were used. After Esmarch exsanguination, the tourniquet was elevated to 250 mmHg. His prior laceration was opened and was extended proximally and distally in a Brunner's fashion. Large skin flaps were raised. The EPL tendon was found to be lacerated overlying the proximal phalanx. The tendon was mobilized proximally and distally to allow apposition of the tendon.   A single 0.045 K-wire was then placed in a retrograde fashion across the IP joint holding the digit in full extension. The lacerated tendon was then repaired with multiple horizontal mattress 3-0 Ethibond interrupted sutures. This provided an excellent repair. The wound was thoroughly irrigated. Skin was closed with 4-0 nylon sutures. Sterile dressings were applied. A well-padded, well-molded thumb spica splint was applied. The tourniquet was let down and brisk cap refill returned to the digits. He was transferred to the PACU in stable condition without complication.         Maryann Tello MD      [id number]  D:  01/22/2020 13:00  T:  01/22/2020 22:52  JOB #:  [id number]

## 2020-02-04 ENCOUNTER — APPOINTMENT (OUTPATIENT)
Dept: GENERAL RADIOLOGY | Age: 50
End: 2020-02-04
Attending: EMERGENCY MEDICINE
Payer: COMMERCIAL

## 2020-02-04 ENCOUNTER — HOSPITAL ENCOUNTER (EMERGENCY)
Age: 50
Discharge: HOME | End: 2020-02-04
Attending: EMERGENCY MEDICINE
Payer: COMMERCIAL

## 2020-02-04 VITALS
OXYGEN SATURATION: 98 % | TEMPERATURE: 98.7 F | DIASTOLIC BLOOD PRESSURE: 72 MMHG | RESPIRATION RATE: 16 BRPM | SYSTOLIC BLOOD PRESSURE: 117 MMHG | HEART RATE: 110 BPM

## 2020-02-04 DIAGNOSIS — E10.9 TYPE 1 DIABETES MELLITUS WITHOUT COMPLICATION (HCC): ICD-10-CM

## 2020-02-04 DIAGNOSIS — R73.9 HYPERGLYCEMIA: Primary | ICD-10-CM

## 2020-02-04 LAB
ALBUMIN SERPL-MCNC: 4 G/DL (ref 3.5–5)
ALBUMIN/GLOB SERPL: 0.9 {RATIO} (ref 1.1–2.2)
ALP SERPL-CCNC: 121 U/L (ref 45–117)
ALT SERPL-CCNC: 31 U/L (ref 12–78)
ANION GAP SERPL CALC-SCNC: 6 MMOL/L (ref 5–15)
APPEARANCE UR: CLEAR
AST SERPL-CCNC: 18 U/L (ref 15–37)
BACTERIA URNS QL MICRO: NEGATIVE /HPF
BASOPHILS # BLD: 0.1 K/UL (ref 0–0.1)
BASOPHILS NFR BLD: 1 % (ref 0–1)
BILIRUB SERPL-MCNC: 0.8 MG/DL (ref 0.2–1)
BILIRUB UR QL: NEGATIVE
BUN SERPL-MCNC: 20 MG/DL (ref 6–20)
BUN/CREAT SERPL: 14 (ref 12–20)
CALCIUM SERPL-MCNC: 10.3 MG/DL (ref 8.5–10.1)
CHLORIDE SERPL-SCNC: 96 MMOL/L (ref 97–108)
CO2 SERPL-SCNC: 28 MMOL/L (ref 21–32)
COLOR UR: ABNORMAL
COMMENT, HOLDF: NORMAL
CREAT SERPL-MCNC: 1.46 MG/DL (ref 0.7–1.3)
CRP SERPL-MCNC: <0.29 MG/DL (ref 0–0.6)
DIFFERENTIAL METHOD BLD: ABNORMAL
EOSINOPHIL # BLD: 0 K/UL (ref 0–0.4)
EOSINOPHIL NFR BLD: 0 % (ref 0–7)
EPITH CASTS URNS QL MICRO: ABNORMAL /LPF
ERYTHROCYTE [DISTWIDTH] IN BLOOD BY AUTOMATED COUNT: 13.5 % (ref 11.5–14.5)
ERYTHROCYTE [SEDIMENTATION RATE] IN BLOOD: 7 MM/HR (ref 0–15)
GLOBULIN SER CALC-MCNC: 4.3 G/DL (ref 2–4)
GLUCOSE BLD STRIP.AUTO-MCNC: 310 MG/DL (ref 65–100)
GLUCOSE BLD STRIP.AUTO-MCNC: 529 MG/DL (ref 65–100)
GLUCOSE BLD STRIP.AUTO-MCNC: >600 MG/DL (ref 65–100)
GLUCOSE SERPL-MCNC: 606 MG/DL (ref 65–100)
GLUCOSE UR STRIP.AUTO-MCNC: >1000 MG/DL
HCT VFR BLD AUTO: 45.5 % (ref 36.6–50.3)
HGB BLD-MCNC: 14.8 G/DL (ref 12.1–17)
HGB UR QL STRIP: NEGATIVE
IMM GRANULOCYTES # BLD AUTO: 0 K/UL (ref 0–0.04)
IMM GRANULOCYTES NFR BLD AUTO: 0 % (ref 0–0.5)
KETONES UR QL STRIP.AUTO: 15 MG/DL
LEUKOCYTE ESTERASE UR QL STRIP.AUTO: NEGATIVE
LYMPHOCYTES # BLD: 1.1 K/UL (ref 0.8–3.5)
LYMPHOCYTES NFR BLD: 12 % (ref 12–49)
MCH RBC QN AUTO: 26.4 PG (ref 26–34)
MCHC RBC AUTO-ENTMCNC: 32.5 G/DL (ref 30–36.5)
MCV RBC AUTO: 81.1 FL (ref 80–99)
MONOCYTES # BLD: 0.3 K/UL (ref 0–1)
MONOCYTES NFR BLD: 4 % (ref 5–13)
NEUTS SEG # BLD: 7.7 K/UL (ref 1.8–8)
NEUTS SEG NFR BLD: 83 % (ref 32–75)
NITRITE UR QL STRIP.AUTO: NEGATIVE
NRBC # BLD: 0 K/UL (ref 0–0.01)
NRBC BLD-RTO: 0 PER 100 WBC
PH UR STRIP: 5 [PH] (ref 5–8)
PLATELET # BLD AUTO: 293 K/UL (ref 150–400)
PMV BLD AUTO: 8.9 FL (ref 8.9–12.9)
POTASSIUM SERPL-SCNC: 4.9 MMOL/L (ref 3.5–5.1)
PROT SERPL-MCNC: 8.3 G/DL (ref 6.4–8.2)
PROT UR STRIP-MCNC: NEGATIVE MG/DL
RBC # BLD AUTO: 5.61 M/UL (ref 4.1–5.7)
RBC #/AREA URNS HPF: ABNORMAL /HPF (ref 0–5)
SAMPLES BEING HELD,HOLD: NORMAL
SERVICE CMNT-IMP: ABNORMAL
SODIUM SERPL-SCNC: 130 MMOL/L (ref 136–145)
SP GR UR REFRACTOMETRY: <1.005
UR CULT HOLD, URHOLD: NORMAL
UROBILINOGEN UR QL STRIP.AUTO: 0.2 EU/DL (ref 0.2–1)
WBC # BLD AUTO: 9.2 K/UL (ref 4.1–11.1)
WBC URNS QL MICRO: ABNORMAL /HPF (ref 0–4)

## 2020-02-04 PROCEDURE — 73140 X-RAY EXAM OF FINGER(S): CPT

## 2020-02-04 PROCEDURE — 85652 RBC SED RATE AUTOMATED: CPT

## 2020-02-04 PROCEDURE — 85025 COMPLETE CBC W/AUTO DIFF WBC: CPT

## 2020-02-04 PROCEDURE — 82962 GLUCOSE BLOOD TEST: CPT

## 2020-02-04 PROCEDURE — 96374 THER/PROPH/DIAG INJ IV PUSH: CPT

## 2020-02-04 PROCEDURE — 36415 COLL VENOUS BLD VENIPUNCTURE: CPT

## 2020-02-04 PROCEDURE — 80053 COMPREHEN METABOLIC PANEL: CPT

## 2020-02-04 PROCEDURE — 86140 C-REACTIVE PROTEIN: CPT

## 2020-02-04 PROCEDURE — 99284 EMERGENCY DEPT VISIT MOD MDM: CPT

## 2020-02-04 PROCEDURE — 96361 HYDRATE IV INFUSION ADD-ON: CPT

## 2020-02-04 PROCEDURE — 74011636637 HC RX REV CODE- 636/637: Performed by: EMERGENCY MEDICINE

## 2020-02-04 PROCEDURE — 81001 URINALYSIS AUTO W/SCOPE: CPT

## 2020-02-04 PROCEDURE — 74011250636 HC RX REV CODE- 250/636: Performed by: EMERGENCY MEDICINE

## 2020-02-04 RX ADMIN — SODIUM CHLORIDE 1000 ML: 900 INJECTION, SOLUTION INTRAVENOUS at 18:13

## 2020-02-04 RX ADMIN — HUMAN INSULIN 7 UNITS: 100 INJECTION, SOLUTION SUBCUTANEOUS at 18:12

## 2020-02-04 NOTE — ED TRIAGE NOTES
Pt stated he woke up this afternoon and his blood sugar was over 600, pt took some short acting insulin , did not take his long acting insulin last night because he ran out of it, denies vomiting, denies fever, +nausea

## 2020-02-04 NOTE — ED PROVIDER NOTES
53 yo male with hx diabetes presents with complaint of high blood sugar. Was greater than 600 at 12 pm.  Took 6 units of novolog at noon and rechecked right before coming and was still above 600 mg. Ran out of treciba, long acting insulin last night. No fevers. Had tendon repair left hand 1/22/2020. Has not yet had a follow up with dr regalado but has an appointment tomorrow. Has not changed diet recently. No cp or sob. Reports has pcp appointment in February.  Took keflex post op     + smoker           Past Medical History:   Diagnosis Date    Diabetes ([facility] [address]     Neuropathy        Past Surgical History:   Procedure Laterality Date    HX BACK SURGERY      L5    HX OTHER SURGICAL Bilateral     knee surgery    HX OTHER SURGICAL      thumb surgery for tendon repair         Family History:   Problem Relation Age of Onset    Diabetes Mother     Cancer Father     Diabetes Maternal Aunt        Social History     Socioeconomic History    Marital status:      Spouse name: Not on file    Number of children: Not on file    Years of education: Not on file    Highest education level: Not on file   Occupational History    Not on file   Social Needs    Financial resource strain: Not on file    Food insecurity:     Worry: Not on file     Inability: Not on file    Transportation needs:     Medical: Not on file     Non-medical: Not on file   Tobacco Use    Smoking status: Never Smoker    Smokeless tobacco: Never Used   Substance and Sexual Activity    Alcohol use: Never     Frequency: Never    Drug use: Never    Sexual activity: Not on file   Lifestyle    Physical activity:     Days per week: Not on file     Minutes per session: Not on file    Stress: Not on file   Relationships    Social connections:     Talks on phone: Not on file     Gets together: Not on file     Attends Religion service: Not on file     Active member of club or organization: Not on file     Attends meetings of clubs or organizations: Not on file     Relationship status: Not on file    Intimate partner violence:     Fear of current or ex partner: Not on file     Emotionally abused: Not on file     Physically abused: Not on file     Forced sexual activity: Not on file   Other Topics Concern    Not on file   Social History Narrative    Not on file         ALLERGIES: Patient has no known allergies. Review of Systems   Constitutional: Negative for fever. Respiratory: Negative for shortness of breath. Cardiovascular: Negative for chest pain. Neurological: Negative for dizziness. All other systems reviewed and are negative. Vitals:    02/04/20 1543   BP: 117/72   Pulse: (!) 110   Resp: 16   Temp: 98.7 °F (37.1 °C)   SpO2: 98%            Physical Exam  Vitals signs and nursing note reviewed. Constitutional:       General: He is not in acute distress. Appearance: He is well-developed. He is not diaphoretic. HENT:      Head: Normocephalic and atraumatic. Eyes:      Conjunctiva/sclera: Conjunctivae normal.   Neck:      Musculoskeletal: Normal range of motion and neck supple. Cardiovascular:      Rate and Rhythm: Normal rate and regular rhythm. Pulses: Normal pulses. Heart sounds: Normal heart sounds. No murmur. No friction rub. Pulmonary:      Effort: Pulmonary effort is normal. No respiratory distress. Breath sounds: Normal breath sounds. No stridor. No wheezing, rhonchi or rales. Chest:      Chest wall: No tenderness. Abdominal:      General: Bowel sounds are normal. There is no distension. Palpations: Abdomen is soft. There is no mass. Tenderness: There is no abdominal tenderness. There is no guarding or rebound. Hernia: No hernia is present. Musculoskeletal: Normal range of motion. Comments: Left thumb with incision with no drainage. Some surrounding erythema to thumb with minimal warmth- nvi   Skin:     General: Skin is warm and dry. Coloration: Skin is not pale. Neurological:      Mental Status: He is alert and oriented to person, place, and time. Motor: No abnormal muscle tone. Coordination: Coordination normal.   Psychiatric:         Behavior: Behavior normal.          MDM  Number of Diagnoses or Management Options  Hyperglycemia:   Type 1 diabetes mellitus without complication [facility]):   Diagnosis management comments: ? Elevated blood sugar from not taking long acting insulin vs beginning of infection in left thumb post op. Check wbc sed rate and crp       4:00 PM  Spoke with pts pharmacy leslee and they are getting a refill ready for $5 pt reports he can pick this up        Amount and/or Complexity of Data Reviewed  Clinical lab tests: ordered and reviewed  Tests in the radiology section of CPT®: ordered and reviewed  Discuss the patient with other providers: yes (Patients pharmacy)    Patient Progress  Patient progress: stable         Procedures              7:26 PM  Discharge vitals 141/96 96 and 100%    7:28 PM  Patient's results have been reviewed with them. Patient and/or family have verbally conveyed their understanding and agreement of the patient's signs, symptoms, diagnosis, treatment and prognosis and additionally agree to follow up as recommended or return to the Emergency Room should their condition change prior to follow-up. Discharge instructions have also been provided to the patient with some educational information regarding their diagnosis as well a list of reasons why they would want to return to the ER prior to their follow-up appointment should their condition change.  Pt going to pharmacy to fill his script now

## 2020-02-05 NOTE — DISCHARGE INSTRUCTIONS
Patient Education        Type 1 Diabetes: Care Instructions  Your Care Instructions    Type 1 diabetes is a lifelong disease that develops when the pancreas stops making insulin. The body needs insulin to let sugar (glucose) move from the blood into the body's cells, where it can be used for energy or stored for later use. Without insulin, the sugar cannot get into the cells to do its work. It stays in the blood instead. This can cause high blood sugar levels. A person has diabetes when the blood sugar is too high. Over time, diabetes can lead to diseases of the heart, blood vessels, nerves, kidneys, and eyes. To treat type 1 diabetes, you need insulin. You can give yourself insulin through an insulin pump, an insulin pen, or a syringe (needle). Insulin, exercise, and a healthy diet can help prevent or delay problems from diabetes. With education and support, you will treat diabetes as a part of your life--not your whole life. Seek support when you need it from your family, friends, and your doctor or other diabetes experts. Follow-up care is a key part of your treatment and safety. Be sure to make and go to all appointments, and call your doctor if you are having problems. It's also a good idea to know your test results and keep a list of the medicines you take. How can you care for yourself at home? · Take your insulin on time and in the right dose. This helps keep your blood sugar steady. Do not stop or change your insulin without talking to your doctor first.  · Check and record your blood sugar as often as directed. These records can help your doctor see how you are doing and adjust your treatment if needed. It is important to keep track of any symptoms you have, such as low blood sugar, and any changes in your activities, diet, or insulin use. · Eat a good diet that spreads carbohydrate throughout the day. Carbohydrate--the body's main source of fuel--affects blood sugar more than any other nutrient. Carbohydrate is in fruits, vegetables, milk, and yogurt. It also is in breads, cereals, vegetables such as potatoes and corn, and sugary foods such as candy and cakes. · Aim for 30 minutes of exercise on most, preferably all, days of the week. Walking is a good choice. You also may want to do other activities, such as running, swimming, cycling, or playing tennis or team sports. If your doctor says it's okay, do muscle-strengthening exercises at least 2 times a week. · Control your cholesterol and blood pressure. Exercise and healthy eating can help with these goals. If you have medicine for cholesterol or high blood pressure, take it as directed. Do not stop or change a medicine without talking to your doctor first.  · If you have discussed it with your doctor, take a low-dose aspirin every day to help prevent heart attack and stroke. Do not start taking aspirin unless your doctor knows about it. · Do not smoke. If you need help quitting, talk to your doctor about stop-smoking programs and medicines. These can increase your chances of quitting for good. · Check your feet daily for blisters, cracks, and sores. Have your doctor look at your feet whenever you have a checkup. · Get a checkup every 3 to 6 months. Your doctor will tell you how often to come in. You will need regular tests such as:  ? A hemoglobin A1c test. You may need this test more often than once a year. It is a good measure of how well your treatment is working. ? A cholesterol test.  ? A urine test for protein. This checks for kidney problems. ? A complete foot exam.  ? An eye exam, even if you do not think your vision has changed. When should you call for help? Call 911 anytime you think you may need emergency care. For example, call if:    · You passed out (lost consciousness), or you suddenly become very sleepy or confused.  (You may have very low blood sugar.)     · You have symptoms of high blood sugar, such as:  ? Blurred vision. ? Trouble staying awake or being woken up. ? Fast, deep breathing. ? Breath that smells fruity. ? Belly pain, not feeling hungry, and vomiting. ? Feeling confused.    Call your doctor now or seek immediate medical care if:    · Your blood sugar stays higher than the level your doctor has set for you.     · You have symptoms of low blood sugar, such as:  ? Sweating. ? Feeling nervous, shaky, and weak. ? Extreme hunger and slight nausea. ? Dizziness and headache.  ? Blurred vision. ? Confusion.    Watch closely for changes in your health, and be sure to contact your doctor if:    · You often have problems controlling your blood sugar.     · You have symptoms of long-term diabetes problems, such as:  ? New vision changes. ? New pain, numbness, or tingling in your hands or feet. ? Skin problems. Where can you learn more? Go to http://leticia-zurdo.info/. Enter P859 in the search box to learn more about \"Type 1 Diabetes: Care Instructions. \"  Current as of: April 16, 2019  Content Version: 12.2  © 2463-1408 [employer]. Care instructions adapted under license by [employer] (which disclaims liability or warranty for this information). If you have questions about a medical condition or this instruction, always ask your healthcare professional. [place] [address] any warranty or liability for your use of this information. Patient Education        Learning About Type 1 Diabetes  What is type 1 diabetes? Type 1 diabetes is a disease that starts when your body stops making enough of a hormone called insulin. Insulin helps your body use sugar from your food as energy or store it for later use. If you don't have insulin, too much sugar stays in your blood. Type 1 diabetes can occur at any age, but it usually starts in children or young adults.  It is a lifelong disease, but with treatment and a healthy lifestyle you can live a long and healthy life. What can you expect with type 1 diabetes? Francis Gray keep hearing about how important it is to keep your blood sugar within a target range. That's because over time, high blood sugar can lead to serious problems. It can:  · Harm your eyes, nerves, and kidneys. · Damage your blood vessels, leading to heart disease and stroke. · Reduce blood flow and cause nerve damage to parts of your body, especially your feet. This can cause slow healing and pain when you walk. A more sudden problem can happen when the blood sugar level gets so high that a serious chemical imbalance develops in the blood. This condition can be life-threatening and needs quick treatment. When people hear the word \"diabetes,\" they often think of problems like these. But daily care and treatment can help prevent or delay these problems. The goal is to keep your blood sugar in a target range. It is the best way to reduce your chance of having more problems from diabetes. What are the symptoms? You experience most symptoms of type 1 diabetes when your blood sugar is either too high or too low. Common symptoms of high blood sugar include:  · Thirst.  · Frequent urination. · Weight loss. · Blurry vision. Common symptoms of low blood sugar include:  · Sweating. · Shakiness. · Weakness. · Hunger. · Confusion. If you wait too long to get medical care when your blood sugar goes too high, you may develop diabetic ketoacidosis. Symptoms include:  · Flushed, hot, dry skin. · A strong, fruity breath odor. · Restlessness, drowsiness, or trouble waking up. · Lack of interest in normal activities. · Rapid, deep breathing. · Loss of appetite, abdominal pain, and vomiting. · Confusion. How is type 1 diabetes treated? To treat type 1 diabetes, you need insulin. You can give yourself insulin through an insulin pump, an insulin pen, or a syringe (needle).   When you have type 1 diabetes, it's more important than ever to have a healthy lifestyle. Here are other things you can do to stay healthy:  · Check your blood sugar level often, as advised by your doctor. · Eat a balanced diet that spreads carbohydrate evenly throughout the day. · Control your blood pressure and cholesterol. · Do not smoke. Smoking can make health problems worse. This includes problems you might have with type 1 diabetes. If you need help quitting, talk to your doctor about stop-smoking programs and medicines. These can increase your chances of quitting for good. · Limit alcohol to 2 drinks a day for men and 1 drink a day for women. Too much alcohol can cause health problems. · Get at least 30 minutes of exercise on most days of the week. Walking is a good choice. You also may want to do other activities, such as running, swimming, cycling, or playing tennis or team sports. Follow-up care is a key part of your treatment and safety. Be sure to make and go to all appointments, and call your doctor if you are having problems. It's also a good idea to know your test results and keep a list of the medicines you take. Where can you learn more? Go to http://leticia-zurdo.info/. Enter C197 in the search box to learn more about \"Learning About Type 1 Diabetes. \"  Current as of: April 16, 2019  Content Version: 12.2  © 0625-4250 [employer]. Care instructions adapted under license by [employer] (which disclaims liability or warranty for this information). If you have questions about a medical condition or this instruction, always ask your healthcare professional. [place] [address] any warranty or liability for your use of this information. Patient Education        Learning About High Blood Sugar  What is high blood sugar? Your body turns the food you eat into glucose (sugar), which it uses for energy.  But if your body isn't able to use the sugar right away, it can build up in your blood and lead to high blood sugar. When the amount of sugar in your blood stays too high for too much of the time, you may have diabetes. Diabetes is a disease that can cause serious health problems. The good news is that lifestyle changes may help you get your blood sugar back to normal and avoid or delay diabetes. What causes high blood sugar? Sugar (glucose) can build up in your blood if you:  · Are overweight. · Have a family history of diabetes. · Take certain medicines, such as steroids. What are the symptoms? Having high blood sugar may not cause any symptoms at all. Or it may make you feel very thirsty or very hungry. You may also urinate more often than usual, have blurry vision, or lose weight without trying. How is high blood sugar treated? You can take steps to lower your blood sugar level if you understand what makes it get higher. Your doctor may want you to learn how to test your blood sugar level at home. Then you can see how illness, stress, or different kinds of food or medicine raise or lower your blood sugar level. Other tests may be needed to see if you have diabetes. How can you prevent high blood sugar? · Watch your weight. If you're overweight, losing just a small amount of weight may help. Reducing fat around your waist is most important. · Limit the amount of calories, sweets, and unhealthy fat you eat. Ask your doctor if a dietitian can help you. A registered dietitian can help you create meal plans that fit your lifestyle. · Get at least 30 minutes of exercise on most days of the week. Exercise helps control your blood sugar. It also helps you maintain a healthy weight. Walking is a good choice. You also may want to do other activities, such as running, swimming, cycling, or playing tennis or team sports. · If your doctor prescribed medicines, take them exactly as prescribed. Call your doctor if you think you are having a problem with your medicine.  You will get more details on the specific medicines your doctor prescribes. Follow-up care is a key part of your treatment and safety. Be sure to make and go to all appointments, and call your doctor if you are having problems. It's also a good idea to know your test results and keep a list of the medicines you take. Where can you learn more? Go to http://leticia-zurdo.info/. Enter O108 in the search box to learn more about \"Learning About High Blood Sugar. \"  Current as of: April 16, 2019  Content Version: 12.2  © 1932-9457 [employer]. Care instructions adapted under license by [employer] (which disclaims liability or warranty for this information). If you have questions about a medical condition or this instruction, always ask your healthcare professional. [address] any warranty or liability for your use of this information.

## 2020-02-24 DIAGNOSIS — E10.42 TYPE 1 DIABETES MELLITUS WITH DIABETIC POLYNEUROPATHY (HCC): ICD-10-CM

## 2020-02-24 RX ORDER — GABAPENTIN 800 MG/1
TABLET ORAL
Qty: 90 TAB | Refills: 1 | Status: SHIPPED | OUTPATIENT
Start: 2020-02-24 | End: 2020-04-22 | Stop reason: SDUPTHER

## 2020-04-21 DIAGNOSIS — E10.42 TYPE 1 DIABETES MELLITUS WITH DIABETIC POLYNEUROPATHY (HCC): ICD-10-CM

## 2020-04-22 RX ORDER — GABAPENTIN 800 MG/1
TABLET ORAL
Qty: 90 TAB | Refills: 0 | Status: SHIPPED | OUTPATIENT
Start: 2020-04-22 | End: 2020-06-08 | Stop reason: DRUGHIGH

## 2020-06-03 ENCOUNTER — TELEPHONE (OUTPATIENT)
Dept: INTERNAL MEDICINE CLINIC | Age: 50
End: 2020-06-03

## 2020-06-03 NOTE — TELEPHONE ENCOUNTER
----- Message from Mari Lacey sent at 6/3/2020  3:29 PM EDT -----  Regarding: Lanette Barragan MD  Appointment not available    Caller's first and last name and relationship to patient (if not the patient): Clifton Cline-Pt's Spouse       Best contact number:  [phone]      Preferred date and time: Earliest Available       Scheduled appointment date and time: March 9, 2020 09:45 AM      Reason for appointment: appt r/s       Details to clarify the request: caller able to schedule vv/telemedicine       Jackson Camarillo      Copy/paste [email or web address]

## 2020-06-04 NOTE — TELEPHONE ENCOUNTER
Spoke with patient. Two pt identifiers confirmed. Patient offered an appointment with Dr. Zeinab Pierre on 06/08/2020. Appointment accepted. Patient advised if anything changes or if unable to keep this appointment to call the office  Pt verbalized understanding of information discussed w/ no further questions at this time.

## 2020-06-08 ENCOUNTER — VIRTUAL VISIT (OUTPATIENT)
Dept: INTERNAL MEDICINE CLINIC | Age: 50
End: 2020-06-08

## 2020-06-08 DIAGNOSIS — E10.42 TYPE 1 DIABETES MELLITUS WITH DIABETIC POLYNEUROPATHY (HCC): Primary | ICD-10-CM

## 2020-06-08 RX ORDER — GABAPENTIN 800 MG/1
TABLET ORAL
COMMUNITY
Start: 2019-12-30 | End: 2020-06-08 | Stop reason: SDUPTHER

## 2020-06-08 RX ORDER — GABAPENTIN 800 MG/1
TABLET ORAL
Qty: 90 TAB | Refills: 5 | Status: SHIPPED | OUTPATIENT
Start: 2020-06-08 | End: 2020-12-18 | Stop reason: SDUPTHER

## 2020-06-08 RX ORDER — INSULIN ASPART 100 [IU]/ML
INJECTION, SOLUTION INTRAVENOUS; SUBCUTANEOUS
Qty: 5 PEN | Refills: 5 | Status: SHIPPED | OUTPATIENT
Start: 2020-06-08 | End: 2020-08-10 | Stop reason: SDUPTHER

## 2020-06-08 RX ORDER — FLASH GLUCOSE SENSOR
1 KIT MISCELLANEOUS EVERY 2 WEEKS
Qty: 2 KIT | Refills: 4 | Status: SHIPPED | OUTPATIENT
Start: 2020-06-08

## 2020-06-08 RX ORDER — INSULIN DEGLUDEC INJECTION 100 U/ML
INJECTION, SOLUTION SUBCUTANEOUS
Qty: 5 PEN | Refills: 5 | Status: SHIPPED | OUTPATIENT
Start: 2020-06-08 | End: 2021-02-15 | Stop reason: SDUPTHER

## 2020-06-08 NOTE — PROGRESS NOTES
Reviewed record in preparation for visit and have obtained necessary documentation. Identified pt with two pt identifiers(name and ). Chief Complaint   Patient presents with    Medication Refill       Health Maintenance Due   Topic Date Due    Pneumococcal Vaccine (1 of 1 - PPSV23) 1976    Diabetic Foot Care  1980    Hemoglobin A1C    1980    Albumin Urine Test  1980    Eye Exam  1980    Cholesterol Test   1980       Mr. Colletta Ridge has a reminder for a \"due or due soon\" health maintenance. I have asked that he discuss this further with his primary care provider for follow-up on this health maintenance. Coordination of Care Questionnaire:  :     1) Have you been to an emergency room, urgent care clinic since your last visit? no   Hospitalized since your last visit? no             2) Have you seen or consulted any other health care providers outside of [address] [place] [address] since your last visit? no  (Include any pap smears or colon screenings in this section.)    3) In the event something were to happen to you and you were unable to speak on your behalf, do you have an Advance Directive/ Living Will in place stating your wishes? NO    Do you have an Advance Directive on file? no    4) Are you interested in receiving information on Advance Directives? NO    Patient is accompanied by self I have received verbal consent from Lisandro Quintero to discuss any/all medical information while they are present in the room.

## 2020-06-08 NOTE — PROGRESS NOTES
CC: Medication Refill      HPI:    He is a 52 y.o. male who presents for evaluation of     He was  last seen on 10/2019    In the interval    Left extensor pollicis longus tendon laceration work related had repair 01/28/2020 still does not have full motion of thumb  Going back to work on Wednesday     Patient has type 1 diabetes - in the past 6 months obtained elias sensor which helps but is expensive. He had 4-5 episodes of low blood sugar  Currently taking 40 units of tresiba  Short acting patient was cab counting took around 12 units with eating  Typically checks prior to eating  Does not have reads available  Taking gabapentin for neuropathy    This is an established visit conducted via telemedicine with video. The patient has been instructed that this meets HIPAA criteria and acknowledges and agrees to this method of visitation. Pursuant to the emergency declaration under the [facility]5 waiver authority and the [employer] Act, this Virtual Visit was conducted, with patient's consent, to reduce the patient's risk of exposure to COVID-19 and provide continuity of care for an established patient. Services were provided through a video synchronous discussion virtually to substitute for in-person clinic visit. ROS:  Constitutional: negative for fevers, chills, anorexia and weight loss  Eyes:   negative for visual disturbance,  irritation  ENT:   negative for tinnitus,sore throat,nasal congestion,ear pain, sinus pain.    Respiratory:  negative for cough, hemoptysis, dyspnea,wheezing  CV:   negative for chest pain, palpitations, lower extremity edema  GI:   negative for nausea, vomiting, diarrhea, abdominal pain,melena  Genitourinary: negative for frequency, dysuria, hematuria  Musculoskel: negative for myalgias, arthralgias, back pain, muscle weakness, joint pain  Neurological:  negative for headaches, dizziness, focal weakness, numbness  Psych:             Negative for depression and anxiety    Past Medical History:   Diagnosis Date    Diabetes ([facility] [address]     Neuropathy        Current Outpatient Medications on File Prior to Visit   Medication Sig Dispense Refill    flash glucose scanning reader (FREESTYLE [place] 14 DAY READER) misc 1 Each by Does Not Apply route every fourteen (14) days. 1 Each 2    flash glucose sensor (FREESTYLE [place] 14 DAY SENSOR) kit 1 Each by Does Not Apply route Before breakfast, lunch, and dinner. 1 Kit 1    Insulin Needles, Disposable, 31 gauge x 5/16\" ndle  100 units 1 Package 11    ondansetron (ZOFRAN ODT) 4 mg disintegrating tablet Take 1 Tab by mouth every eight (8) hours as needed for Nausea. 10 Tab 0     No current facility-administered medications on file prior to visit.         Past Surgical History:   Procedure Laterality Date    HX BACK SURGERY      L5    HX OTHER SURGICAL Bilateral     knee surgery    HX OTHER SURGICAL      thumb surgery for tendon repair       Family History   Problem Relation Age of Onset    Diabetes Mother     Cancer Father     Diabetes Maternal Aunt      Reviewed and no changes     Social History     Socioeconomic History    Marital status:      Spouse name: Not on file    Number of children: Not on file    Years of education: Not on file    Highest education level: Not on file   Occupational History    Not on file   Social Needs    Financial resource strain: Not on file    Food insecurity     Worry: Not on file     Inability: Not on file   [employer] needs     Medical: Not on file     Non-medical: Not on file   Tobacco Use    Smoking status: Never Smoker    Smokeless tobacco: Never Used   Substance and Sexual Activity    Alcohol use: Never     Frequency: Never    Drug use: Never    Sexual activity: Not on file   Lifestyle    Physical activity     Days per week: Not on file     Minutes per session: Not on file    Stress: Not on file   Relationships    Social connections     Talks on phone: Not on file     Gets together: Not on file     Attends Yazidism service: Not on file     Active member of club or organization: Not on file     Attends meetings of clubs or organizations: Not on file     Relationship status: Not on file    Intimate partner violence     Fear of current or ex partner: Not on file     Emotionally abused: Not on file     Physically abused: Not on file     Forced sexual activity: Not on file   Other Topics Concern    Not on file   Social History Narrative    Not on file          There were no vitals taken for this visit. Physical Examination:   Gen: well appearing male  HEENT: normal conjunctiva, no audible congestion, patient does not see oral erythema, has MMM  Neck: patient does not feel enlarged or tender LAD or masses  Resp: normal respiratory effort, no audible wheezing. CV: patient does not feel palpitations or heart irregularity  Abd: patient does not feel abdominal tenderness or mass, patient does not notice distension  Extrem: patient does not see swelling in ankles or joints.    Neuro: Alert and oriented, able to answer questions without difficulty, able to move all extremities and walk normally          Lab Results   Component Value Date/Time    WBC 9.2 02/04/2020 04:17 PM    HGB 14.8 02/04/2020 04:17 PM    HCT 45.5 02/04/2020 04:17 PM    PLATELET 004 17/77/1244 04:17 PM    MCV 81.1 02/04/2020 04:17 PM     Lab Results   Component Value Date/Time    Sodium 130 (L) 02/04/2020 04:17 PM    Potassium 4.9 02/04/2020 04:17 PM    Chloride 96 (L) 02/04/2020 04:17 PM    CO2 28 02/04/2020 04:17 PM    Anion gap 6 02/04/2020 04:17 PM    Glucose 606 (HH) 02/04/2020 04:17 PM    BUN 20 02/04/2020 04:17 PM    Creatinine 1.46 (H) 02/04/2020 04:17 PM    BUN/Creatinine ratio 14 02/04/2020 04:17 PM    GFR est AA >60 02/04/2020 04:17 PM    GFR est non-AA 51 (L) 02/04/2020 04:17 PM    Calcium 10.3 (H) 02/04/2020 04:17 PM No results found for: CHOL, CHOLPOCT, CHOLX, CHLST, CHOLV, HDL, HDLPOC, HDLP, LDL, LDLCPOC, LDLC, DLDLP, VLDLC, VLDL, TGLX, TRIGL, TRIGP, TGLPOCT, CHHD, CHHDX  No results found for: TSH, TSH2, TSH3, TSHP, TSHEXT, TSHEXT  No results found for: PSA, PSA2, PSAR1, PSA1, PSAR2, PSA3, PSAR3, [id number], [id number], PSALT  No results found for: HBA1C, HGBE8, DWP2WBMJ, ATW2NEOW, DEO8KDNQ  No results found for: VITD3, XQVID2, XQVID3, Gideon Shuttjuan, VD3RIA    Lab Results   Component Value Date/Time    ALT (SGPT) 31 02/04/2020 04:17 PM    Alk. phosphatase 121 (H) 02/04/2020 04:17 PM    Bilirubin, total 0.8 02/04/2020 04:17 PM           Assessment/Plan:    1. Type 1 diabetes mellitus with diabetic polyneuropathy (HCC)  - gabapentin (NEURONTIN) 800 mg tablet; TK 1 T PO TID  Dispense: 90 Tab; Refill: 5  - flash glucose sensor (FreeStyle Jos[place] 14 Day Sensor) kit; 1 Each by Does Not Apply route Once every 2 weeks. Dispense: 2 Kit; Refill: 4  - REFERRAL TO OPHTHALMOLOGY  - insulin degludec Darlina Samreen FlexTouch U-100) 100 unit/mL (3 mL) inpn; INJECT 40 UNITS UNDER THE SKIN ONCE DAILY  Dispense: 5 Pen; Refill: 5  - insulin aspart U-100 (NOVOLOG) 100 unit/mL (3 mL) inpn; Inject 12 units under the skin before each meal three times daily. Dispense: 5 Pen; Refill: 5  - HEMOGLOBIN A1C W/O EAG  - MICROALBUMIN, UR, RAND W/ MICROALB/CREAT RATIO  - LIPID PANEL  - METABOLIC PANEL, BASIC  Currently not on statin  We dicussed importance of compliance with insulin and consistent diet        Follow-up and Dispositions    · Return in about 3 months (around 9/8/2020) for diabetes . Miguelito Parker MD    This is an established visit conducted via real time video and audio telemedicine. The patient has been instructed that this meets HIPAA criteria and acknowledges and agrees to this method of visitation.

## 2020-06-17 ENCOUNTER — HOSPITAL ENCOUNTER (EMERGENCY)
Age: 50
Discharge: HOME | End: 2020-06-17
Attending: EMERGENCY MEDICINE
Payer: COMMERCIAL

## 2020-06-17 VITALS
WEIGHT: 176.59 LBS | RESPIRATION RATE: 15 BRPM | TEMPERATURE: 97.3 F | DIASTOLIC BLOOD PRESSURE: 96 MMHG | HEART RATE: 92 BPM | HEIGHT: 71 IN | BODY MASS INDEX: 24.72 KG/M2 | OXYGEN SATURATION: 97 % | SYSTOLIC BLOOD PRESSURE: 158 MMHG

## 2020-06-17 DIAGNOSIS — E16.2 HYPOGLYCEMIA: Primary | ICD-10-CM

## 2020-06-17 LAB
COMMENT, HOLDF: NORMAL
EST. AVERAGE GLUCOSE BLD GHB EST-MCNC: 240 MG/DL
GLUCOSE BLD STRIP.AUTO-MCNC: 103 MG/DL (ref 65–100)
GLUCOSE BLD STRIP.AUTO-MCNC: 146 MG/DL (ref 65–100)
HBA1C MFR BLD: 10 % (ref 4–5.6)
SAMPLES BEING HELD,HOLD: NORMAL
SERVICE CMNT-IMP: ABNORMAL
SERVICE CMNT-IMP: ABNORMAL

## 2020-06-17 PROCEDURE — 36415 COLL VENOUS BLD VENIPUNCTURE: CPT

## 2020-06-17 PROCEDURE — 99285 EMERGENCY DEPT VISIT HI MDM: CPT

## 2020-06-17 PROCEDURE — 82962 GLUCOSE BLOOD TEST: CPT

## 2020-06-17 PROCEDURE — 83036 HEMOGLOBIN GLYCOSYLATED A1C: CPT

## 2020-06-17 NOTE — ED TRIAGE NOTES
Pt arrives via EMS from home where pt's wife found him unresponsive. EMS reports BG 32, administered 250 mL D10, BG then 150. Pt states he took 10 units of novolog yesterday and his usual 40 units of tresiba. Pt states he does not have a meter. Reports having a similar episode which he self-treated with foods at home.

## 2020-06-17 NOTE — PROGRESS NOTES
Date of previous inpatient admission/ ED visit? 2/4/20    What brought the patient back to ED? Low blood sugar    Did patient decline recommended services during last admission/ ED visit (if yes, what)? He has been unable to afford the strips to check his blood sugars. Has patient seen a provider since their last inpatient admission/ED visit (if yes, when)? 6/4/20  - Appointment with PCP    PCP: First and Last name:   Dr. Nevaeh Saab   Name of Practice:    [facility]   Are you a current patient: Yes/No:  Yes   Approximate date of last visit:   6/4/20    CM Interventions:    Care Management Interventions  PCP Verified by CM: Yes  Last Visit to PCP: 06/04/20  Transition of Care Consult (CM Consult): Discharge Planning(CM Consulted for assistance with resources, [facility] Program for Diabetes Health)  Current Support Network: Other(Lives at extended stay [facility], is now working)  Confirm Follow Up Transport: (CM set up Round Trip for transportation home, a friend picked him up prior to Round Trip Arriving)  Discharge Location  Discharge Placement: Home(Discharged to home/extended stay hotel)    CM met with patient to give information on reasonable glucose monitors and STRIPS, Under $10 for monitor and 50 strips. Diabetic Educator to ED to discuss with patient. He reports that he did not have the supplies to check his blood sugars, he thinks he may have given himself too much insulin. Verified face sheet and demographics.     Shantelle Ruiz, RN, BSN, [facility]  ED Care Management  [phone]

## 2020-06-17 NOTE — ED NOTES
Verbal shift change report given to Vangie Mueller RN (oncoming nurse) by Daniele Mejia RN (offgoing nurse). Report included the following information SBAR, ED Summary, Intake/Output, MAR and Recent Results.

## 2020-06-17 NOTE — DISCHARGE INSTRUCTIONS
Patient Education      Decrease Tresiba to 36 units each night; start monitoring glucose at home again  Hypoglycemia: Care Instructions  Your Care Instructions     Hypoglycemia means that your blood sugar is low and your body is not getting enough fuel. Some people get low blood sugar from not eating often enough. Some medicines to treat diabetes can cause low blood sugar. People who have had surgery on their stomachs or intestines may get hypoglycemia. Problems with the pancreas, kidneys, or liver also can cause low blood sugar. A snack or drink with sugar in it will raise your blood sugar and should ease your symptoms right away. Your doctor may recommend that you change or stop your medicines until you can get your blood sugar levels under control. In the long run, you may need to change your diet and eating habits so that you get enough fuel for your body throughout the day. Follow-up care is a key part of your treatment and safety. Be sure to make and go to all appointments, and call your doctor if you are having problems. It's also a good idea to know your test results and keep a list of the medicines you take. How can you care for yourself at home? · Learn to recognize the early signs of low blood sugar. Signs include:  ? Nausea. ? Hunger. ? Feeling nervous, irritable, or shaky. ? Cold, clammy, wet skin. ? Sweating (when you are not exercising). ? A fast heartbeat.  ? Numbness or tingling of the fingertips or lips. · If you feel an episode of low blood sugar coming on, eat or drink a quick-sugar food. Some examples of quick-sugar foods are glucose tablets, table sugar, hard candy (such as Life Savers), fruit juice, and regular (not diet) soda. · Eat small, frequent meals so that you do not get too hungry between meals. · Balance extra exercise with eating more.   · Keep a written record of your low blood sugar episodes, including when you last ate and what you ate, so that you can learn what causes your blood sugar to drop. · Make sure your family, friends, and coworkers know the symptoms of low blood sugar and know what to do to get your sugar level up. · Wear medical alert jewelry that lists your condition. You can buy this at most drugstores. When should you call for help? [id number] anytime you think you may need emergency care. For example, call if:  · You passed out (lost consciousness). · You are confused or cannot think clearly. · Your blood sugar is very high or very low. Watch closely for changes in your health, and be sure to contact your doctor if:  · Your blood sugar stays outside the level your doctor set for you. · You have any problems. Where can you learn more? Go to http://leticia-zurdo.info/  Enter R955 in the search box to learn more about \"Hypoglycemia: Care Instructions. \"  Current as of: December 20, 2019               Content Version: 12.5  © 1336-0546 [employer]wise, Incorporated. Care instructions adapted under license by [employer] (which disclaims liability or warranty for this information). If you have questions about a medical condition or this instruction, always ask your healthcare professional. [place] [address] any warranty or liability for your use of this information.

## 2020-06-17 NOTE — ED PROVIDER NOTES
HPI .  Patient is living with his family and dog in a residential motel. He has been out of work but started back to work in the last few days. He has a glucose sensor but no meter at present. The sensor supplies cost $40 every 2 weeks and he does not have the supplies. He felt like his glucose dropped yesterday. He ate a sandwich and felt better. This morning patient was confused. Glucose was 32. EMS gave D10 250 mL. Patient feels better now. Patient took 40 units of long-acting insulin last night along with 10 units of short acting insulin. Patient feels numb all over but feels much better in general.  He says he has been eating normally. He does not complain of vomiting.     Past Medical History:   Diagnosis Date    Diabetes ([facility] [address]     Neuropathy        Past Surgical History:   Procedure Laterality Date    HX BACK SURGERY      L5    HX OTHER SURGICAL Bilateral     knee surgery    HX OTHER SURGICAL      thumb surgery for tendon repair         Family History:   Problem Relation Age of Onset    Diabetes Mother     Cancer Father     Diabetes Maternal Aunt        Social History     Socioeconomic History    Marital status:      Spouse name: Not on file    Number of children: Not on file    Years of education: Not on file    Highest education level: Not on file   Occupational History    Not on file   Social Needs    Financial resource strain: Not on file    Food insecurity     Worry: Not on file     Inability: Not on file    Transportation needs     Medical: Not on file     Non-medical: Not on file   Tobacco Use    Smoking status: Never Smoker    Smokeless tobacco: Never Used   Substance and Sexual Activity    Alcohol use: Never     Frequency: Never    Drug use: Never    Sexual activity: Not on file   Lifestyle    Physical activity     Days per week: Not on file     Minutes per session: Not on file    Stress: Not on file   Relationships    Social connections     Talks on phone: Not on file     Gets together: Not on file     Attends [employer] service: Not on file     Active member of club or organization: Not on file     Attends meetings of clubs or organizations: Not on file     Relationship status: Not on file    Intimate partner violence     Fear of current or ex partner: Not on file     Emotionally abused: Not on file     Physically abused: Not on file     Forced sexual activity: Not on file   Other Topics Concern    Not on file   Social History Narrative    Not on file         ALLERGIES: Patient has no known allergies. Review of Systems   All other systems reviewed and are negative. There were no vitals filed for this visit. Physical Exam  Vitals signs and nursing note reviewed. Constitutional:       Appearance: He is well-developed. HENT:      Head: Normocephalic and atraumatic. Eyes:      Pupils: Pupils are equal, round, and reactive to light. Neck:      Musculoskeletal: Normal range of motion and neck supple. Cardiovascular:      Rate and Rhythm: Normal rate and regular rhythm. Heart sounds: Normal heart sounds. No murmur. No friction rub. No gallop. Pulmonary:      Effort: Pulmonary effort is normal. No respiratory distress. Breath sounds: No wheezing or rales. Abdominal:      Palpations: Abdomen is soft. Tenderness: There is no abdominal tenderness. There is no rebound. Musculoskeletal: Normal range of motion. General: No tenderness. Skin:     Findings: No erythema. Neurological:      Mental Status: He is alert. Cranial Nerves: No cranial nerve deficit. Comments: Motor; symmetric   Psychiatric:         Behavior: Behavior normal.          MDM       Procedures      Note: Patient says he does have a One Touch meter but no strips. The strips are not that expensive he says so he will buy strips and monitor his glucose that way for now.    7:34 AM  Sabi Lambert MD

## 2020-06-17 NOTE — DIABETES MGMT
[facility]UEY [facility]  CLINICAL NURSE SPECIALIST CONSULT  PROGRAM FOR DIABETES HEALTH    INITIAL NOTE    Presentation   Esteban Soliz is a 52 y.o. male admitted with hypoglycemia this morning 32mg/dl. He has a history of 20yrs Type 1 DM. Recently he was out of work and has been unable to afford his FreeStyle CGM sensors (40$ every 2 weeks). Therefore, he has been unable to check his BG for over 2weeks. Diabetes: Patient has known Type 1 diabetes x 20yrs, A1C 10% and treated with [place] 40units daily, and Novolog 10units with meals PTA. Family history negative for diabetes. Consulted by Provider for advanced diabetes nursing assessment and care, specifically related to   [] Transitioning off [place] Richelle   [] Inpatient management strategy  [x] Home management assessment  [] Survival skill education    Diabetes-related medical history  Acute complications  Recurrent hypoglycemia (had low BG back in April- 35mg/dl)  Neurological complications  Peripheral neuropathy  Microvascular disease  denies  Macrovascular disease  denies  Other associated conditions     NONE    Diabetes medication history  Drug class Currently in use Discontinued Never used   Biguanide      DDP-4 inhibitor       Sulfonylurea      Thiazolidinedione      GLP-1 RA      SGLT-2 inhibitors      Basal insulin Tresiba 40units nightly     Fixed Dose  Combinations      Bolus insulin Novolog 10units with meals       Subjective   Mr. Aiyana Crouch has recently been out of work (just started back this week) and has not been able to afford his CGM sensors (FreeStyle). He did not have the supplies to check his BG via a meter. He has a 20yr history of Type 1 DM and only has had major complications of DKA about 3yrs ago when he was living in [place]. He has had 2 episodes of low BG, once back in April and then today. He stated he thinks he \"over compensated\" and gave himself too much Novolog last evening after eating cookies and milk.   His dinner consisted of a chicken sandwich with bun and milk which he did not cover for -. He states he does have insurance through his work, but the copays are higher (40$ every 2 weeks for sensors). He prefers using the Starfish 360 as it saves his fingers. He uses his hands with his work. He last checked his BG 2 weeks ago and hasnt had the supplies to check his BG via a meter. Patient reports the following home diabetes self-care practices:    Physical activity pattern-active; works on his feet    Monitoring pattern-via Starfish 360    Taking medications pattern  [x] Consistent administration  [] Affordable -can't afford FreeStyle Josue at this time    Social determinants of health impacting diabetes self-management practices   None voiced    Objective   Physical exam  General Alert, oriented and in no acute distress. Conversant and cooperative. Vital Signs   Visit Vitals  BP (!) 158/96   Pulse 92   Temp 97.3 °F (36.3 °C)   Resp 15   Ht 5' 11\" (1.803 m)   Wt 80.1 kg (176 lb 9.4 oz)   SpO2 97%   BMI 24.63 kg/m²     Skin  Warm and dry. Heart   Regular rate and rhythm. No murmurs, rubs or gallops  Lungs  Clear to auscultation without rales or rhonchi  Extremities  Right big toe blister, no drainage, edges approximated. States he was wearing steel toe shoes and this blister came up. Has stopped wearing those shoes. Diabetic foot exam:    Left Foot     Visual Exam: normal    Pulse DP: 2+ (normal)   Filament test: reduced sensation    Vibratory sensation: normal  Right Foot   Visual Exam: ulcer- right big toe   Pulse DP: 2+ (normal)   Filament test: reduced sensation    Vibratory sensation: diminished DP & PT pulses +2.      Laboratory  No results found for: HBA1C, HGBE8, GNG3QKUO, CWM9IVBV  No results found for: LDL, LDLC, DLDLP  Lab Results   Component Value Date/Time    Creatinine 1.46 (H) 02/04/2020 04:17 PM     Lab Results   Component Value Date/Time    Sodium 130 (L) 02/04/2020 04:17 PM    Potassium 4.9 02/04/2020 04:17 PM    Chloride 96 (L) 02/04/2020 04:17 PM    CO2 28 02/04/2020 04:17 PM    Anion gap 6 02/04/2020 04:17 PM    Glucose 606 (HH) 02/04/2020 04:17 PM    BUN 20 02/04/2020 04:17 PM    Creatinine 1.46 (H) 02/04/2020 04:17 PM    BUN/Creatinine ratio 14 02/04/2020 04:17 PM    GFR est AA >60 02/04/2020 04:17 PM    GFR est non-AA 51 (L) 02/04/2020 04:17 PM    Calcium 10.3 (H) 02/04/2020 04:17 PM    Bilirubin, total 0.8 02/04/2020 04:17 PM    Alk. phosphatase 121 (H) 02/04/2020 04:17 PM    Protein, total 8.3 (H) 02/04/2020 04:17 PM    Albumin 4.0 02/04/2020 04:17 PM    Globulin 4.3 (H) 02/04/2020 04:17 PM    A-G Ratio 0.9 (L) 02/04/2020 04:17 PM    ALT (SGPT) 31 02/04/2020 04:17 PM     Lab Results   Component Value Date/Time    ALT (SGPT) 31 02/04/2020 04:17 PM         Assessment and Plan   Nursing Diagnosis Risk for unstable blood glucose pattern   Nursing Intervention Domain 5250 Decision-making Support   Nursing Interventions Examined current inpatient diabetes control   Explored factors facilitating and impeding inpatient management  Identified self-management practices impeding diabetes control  Explored corrective strategies with patient and responsible inpatient provider   Informed patient of rational for insulin strategy while hospitalized  Instructed patient in where to find affordable glucometers, and strips that he can afford. Evaluation   Mr. Quinton Dallas , with Type1  diabetes, has  achieved inpatient blood glucose target of 100-180mg/dl. His BG is now within normal range - Mr. Cline admits to possibly taking too much Novolog last evening after his snack. He has had another low BG recently back in April too. Inpatient blood glucose management has been impacted by  [] Kidney dysfunction  [x] Erratic meal consumption  [] Glucocorticoid use      Recommendations/Discharge Planning   1. A1C obtained: 10%  Since A1C is elevated, it will be necessary to adjust his insulin requirement.   Dr. Antoinette Rodríguez reduced his basal insulin to 36units nightly to decrease the chance for low BG    2. He admits he \"guesses\" his carb intake and does not carb count. 3.Will forward note to PCP for follow up/      Billing Code(s)     Thank you for including us in their care. I spent 60 minutes in direct patient care today for this patient.   Time includes chart review, face to face with patient and collaboration with interdisciplinary care team.      Vivian Montejo, CNS  Program for Diabetes Health  Access via BRANDEE Renner [facility] [phone]

## 2020-08-10 DIAGNOSIS — E10.42 TYPE 1 DIABETES MELLITUS WITH DIABETIC POLYNEUROPATHY (HCC): ICD-10-CM

## 2020-08-10 RX ORDER — INSULIN ASPART 100 [IU]/ML
INJECTION, SOLUTION INTRAVENOUS; SUBCUTANEOUS
Qty: 5 PEN | Refills: 5 | Status: SHIPPED | OUTPATIENT
Start: 2020-08-10 | End: 2021-07-16 | Stop reason: SDUPTHER

## 2020-08-10 NOTE — TELEPHONE ENCOUNTER
Future Appointments:  No future appointments. Last Appointment With Me:  6/8/2020     Last Appointment My Department:  Visit date not found    Requested Prescriptions     Pending Prescriptions Disp Refills    insulin aspart U-100 (NOVOLOG) 100 unit/mL (3 mL) inpn 5 Pen 5     Sig: Inject 12 units under the skin before each meal three times daily.

## 2020-12-18 DIAGNOSIS — E10.42 TYPE 1 DIABETES MELLITUS WITH DIABETIC POLYNEUROPATHY (HCC): ICD-10-CM

## 2020-12-18 RX ORDER — GABAPENTIN 800 MG/1
TABLET ORAL
Qty: 90 TAB | Refills: 5 | Status: SHIPPED | OUTPATIENT
Start: 2020-12-18 | End: 2020-12-21 | Stop reason: SDUPTHER

## 2020-12-21 ENCOUNTER — TRANSCRIBE ORDER (OUTPATIENT)
Dept: INTERNAL MEDICINE CLINIC | Age: 50
End: 2020-12-21

## 2020-12-21 DIAGNOSIS — E10.42 TYPE 1 DIABETES MELLITUS WITH DIABETIC POLYNEUROPATHY (HCC): ICD-10-CM

## 2020-12-22 ENCOUNTER — TELEPHONE (OUTPATIENT)
Dept: INTERNAL MEDICINE CLINIC | Age: 50
End: 2020-12-22

## 2020-12-22 RX ORDER — GABAPENTIN 800 MG/1
TABLET ORAL
Qty: 90 TAB | Refills: 5 | Status: SHIPPED | OUTPATIENT
Start: 2020-12-22 | End: 2021-07-16 | Stop reason: SDUPTHER

## 2020-12-22 NOTE — TELEPHONE ENCOUNTER
Joy JACINTO [facility] Front Office Pool             Caller's first and last name: Clint Williamson, Wife           Reason for call: Medication/ Change of Pharmacy           Callback required yes/no and why: Yes           Best contact number(s): [phone]           Details to clarify the request: Pt medication Gabapentin was sent to wrong [address] [facility] location.  Medication should have been sent to [address], [address] Phone telephone [phone].

## 2020-12-24 DIAGNOSIS — E10.42 TYPE 1 DIABETES MELLITUS WITH DIABETIC POLYNEUROPATHY (HCC): ICD-10-CM

## 2020-12-24 NOTE — TELEPHONE ENCOUNTER
Wife states pt needs his Lantus insulin.   He will need this today if possible as pt will be out Monday morning

## 2020-12-25 RX ORDER — INSULIN GLARGINE 100 [IU]/ML
40 INJECTION, SOLUTION SUBCUTANEOUS
Qty: 5 PEN | Refills: 1 | Status: SHIPPED | OUTPATIENT
Start: 2020-12-25 | End: 2021-07-16 | Stop reason: ALTCHOICE

## 2021-01-20 DIAGNOSIS — E10.42 TYPE 1 DIABETES MELLITUS WITH DIABETIC POLYNEUROPATHY (HCC): ICD-10-CM

## 2021-01-20 RX ORDER — PEN NEEDLE, DIABETIC 30 GX3/16"
NEEDLE, DISPOSABLE MISCELLANEOUS
Qty: 1 PACKAGE | Refills: 11 | Status: SHIPPED | OUTPATIENT
Start: 2021-01-20 | End: 2021-01-27 | Stop reason: SDUPTHER

## 2021-01-20 NOTE — TELEPHONE ENCOUNTER
Future Appointments:  No future appointments.      Last Appointment With Me:  Visit date not found     Requested Prescriptions     Pending Prescriptions Disp Refills    Insulin Needles, Disposable, 31 gauge x \" ndle 1 Package 11     Si units

## 2021-01-27 DIAGNOSIS — E10.42 TYPE 1 DIABETES MELLITUS WITH DIABETIC POLYNEUROPATHY (HCC): ICD-10-CM

## 2021-01-27 RX ORDER — PEN NEEDLE, DIABETIC 30 GX3/16"
NEEDLE, DISPOSABLE MISCELLANEOUS
Qty: 1 PACKAGE | Refills: 11 | Status: SHIPPED | OUTPATIENT
Start: 2021-01-27

## 2021-01-27 NOTE — TELEPHONE ENCOUNTER
Pt wife, Sylvia Adames,  called. Pt needs Pen Needles for insulin pen. Pt OUT. Please send refill as soon as possible to pharm on file. Also, Mrs Nate Muñoz requests assistance with [facility]-- she asks if she needs a code, she states she thinks they may be locked out.     Phone: [phone]

## 2021-01-29 ENCOUNTER — TELEPHONE (OUTPATIENT)
Dept: INTERNAL MEDICINE CLINIC | Age: 51
End: 2021-01-29

## 2021-02-15 DIAGNOSIS — E10.42 TYPE 1 DIABETES MELLITUS WITH DIABETIC POLYNEUROPATHY (HCC): ICD-10-CM

## 2021-02-15 RX ORDER — INSULIN DEGLUDEC INJECTION 100 U/ML
INJECTION, SOLUTION SUBCUTANEOUS
Qty: 5 PEN | Refills: 5 | Status: SHIPPED | OUTPATIENT
Start: 2021-02-15 | End: 2021-09-03 | Stop reason: SDUPTHER

## 2021-02-15 NOTE — TELEPHONE ENCOUNTER
----- Message from Patricia Gonzáles sent at 2/15/2021 11:31 AM EST -----  Regarding: Dr. John Plunkett  Medication Refill    Caller (if not patient): n/a      Relationship of caller (if not patient): n/a      Best contact number(s): [phone]      Name of medication and dosage if known: Treseva 100mg      Is patient out of this medication (yes/no): yes      Pharmacy name: [address] listed in chart? (yes/no): yes  Pharmacy phone number: unknown      Message from [facility]

## 2021-02-15 NOTE — TELEPHONE ENCOUNTER
----- Message from Nasreen Norris sent at 2/12/2021  3:43 PM EST -----  Regarding: DR Tereza Watkins / TELEPHONE  General Message/Vendor C      Pt reports that he is out of his \" long TERM INSULIN\",   he is not aware of name     Please brando campbell refill vto pharmacy listed.        Callback required yes/no and why:      Best contact number(s):      Details to clarify the request:      Nasreen Norris     copy/paste Marianna

## 2021-02-15 NOTE — TELEPHONE ENCOUNTER
MD Yuridia Duncan LPN   Caller: Unspecified (Today,  7:23 AM)             Ordered, patient is due for follow up      Spoke with patient. Two pt identifiers confirmed. Patient advised per Dr. Evan Esparza that his insulin prescription has been sent to his pharmacy on file. Patient advised per Dr. Evan Esparza that he is overdue for an appointment. Patient states that he has already scheduled. Pt verbalized understanding of information discussed w/ no further questions at this time.

## 2021-07-06 DIAGNOSIS — E10.42 TYPE 1 DIABETES MELLITUS WITH DIABETIC POLYNEUROPATHY (HCC): Primary | ICD-10-CM

## 2021-07-06 NOTE — TELEPHONE ENCOUNTER
Wife states pt has been without insulin for a bit. Pt has new insurance and all they will pay for is the Humalog. Pt will need a script sent to [facility] on file for this yet today. Please call wife with any questions/problems. Thanks.

## 2021-07-06 NOTE — TELEPHONE ENCOUNTER
Attempted to return wife's call without success. VM is not set up & unable to LM. Patient is overdue for follow up (was due 09/2020 & cancelled appt in Feb 2021). Appointment will need to be scheduled for continuity of care.

## 2021-07-07 NOTE — TELEPHONE ENCOUNTER
MADDISON- Spoke to patient and he will come by office tomorrow 7/8 to  insulin samples and we scheduled for a VV on Friday 7/16 for overdue f/up.

## 2021-07-09 RX ORDER — INSULIN DEGLUDEC INJECTION 100 U/ML
INJECTION, SOLUTION SUBCUTANEOUS
Qty: 2 PEN | Refills: 0 | Status: SHIPPED | COMMUNITY
Start: 2021-07-09 | End: 2021-09-03 | Stop reason: SDUPTHER

## 2021-07-15 ENCOUNTER — TELEPHONE (OUTPATIENT)
Dept: INTERNAL MEDICINE CLINIC | Age: 51
End: 2021-07-15

## 2021-07-15 NOTE — TELEPHONE ENCOUNTER
[employer] is calling to give you a number to start/finish the PA for Novalog flex pen.     Please call #[phone]

## 2021-07-16 ENCOUNTER — VIRTUAL VISIT (OUTPATIENT)
Dept: INTERNAL MEDICINE CLINIC | Age: 51
End: 2021-07-16
Payer: COMMERCIAL

## 2021-07-16 DIAGNOSIS — E10.42 TYPE 1 DIABETES MELLITUS WITH DIABETIC POLYNEUROPATHY (HCC): Primary | ICD-10-CM

## 2021-07-16 DIAGNOSIS — Z11.59 ENCOUNTER FOR HEPATITIS C SCREENING TEST FOR LOW RISK PATIENT: ICD-10-CM

## 2021-07-16 PROCEDURE — 99214 OFFICE O/P EST MOD 30 MIN: CPT | Performed by: INTERNAL MEDICINE

## 2021-07-16 RX ORDER — PEN NEEDLE, DIABETIC 30 GX3/16"
NEEDLE, DISPOSABLE MISCELLANEOUS
Qty: 1 PACKAGE | Refills: 11 | Status: SHIPPED | OUTPATIENT
Start: 2021-07-16

## 2021-07-16 RX ORDER — GABAPENTIN 800 MG/1
TABLET ORAL
Qty: 90 TABLET | Refills: 5 | Status: SHIPPED | OUTPATIENT
Start: 2021-07-16 | End: 2021-08-16 | Stop reason: SDUPTHER

## 2021-07-16 NOTE — PROGRESS NOTES
CC: Diabetes      HPI:    He is a 46 y.o. male who presents for evaluation of     He was  last seen  Approximately one year ago       Patient has type 1 diabetes  Patient switched jobs and changed insurance and having issues with cost/ hiatus in care       Currently taking 40 units of tresiba  Short acting patient takes about 10 units with meals - has been out of short acting due to cost  Typically checks prior to eating  Blood sugar in the 300 range since out of short acting insulin   Taking gabapentin for neuropathy  Overdue for eye exam    This is an established visit conducted via telemedicine with video. The patient has been instructed that this meets HIPAA criteria and acknowledges and agrees to this method of visitation. Pursuant to the emergency declaration under the [facility], 1135 waiver authority and the [employer] Act, this Virtual Visit was conducted, with patient's consent, to reduce the patient's risk of exposure to COVID-19 and provide continuity of care for an established patient. Services were provided through a video synchronous discussion virtually to substitute for in-person clinic visit.        ROS:  Constitutional: negative for fevers, chills, anorexia and weight loss  10 systems reviewed and negative other than HPI     Past Medical History:   Diagnosis Date    Diabetes ([facility] [address]     Neuropathy        Current Outpatient Medications on File Prior to Visit   Medication Sig Dispense Refill    insulin degludec Olivia Ruffing FlexTouch U-100) 100 unit/mL (3 mL) inpn INJECT 40 UNITS UNDER THE SKIN ONCE DAILY 2 Pen 0    insulin degludec Olivia Ruffing FlexTouch U-100) 100 unit/mL (3 mL) inpn INJECT 40 UNITS UNDER THE SKIN ONCE DAILY 5 Pen 5    Insulin Needles, Disposable, 31 gauge x 5/16\" ndle  100 units 1 Package 11    insulin glargine (Lantus Solostar U-100 Insulin) 100 unit/mL (3 mL) inpn 40 Units by SubCUTAneous route nightly. Indications: 24 units at night and 18 units in the morning 5 Pen 1    gabapentin (NEURONTIN) 800 mg tablet TK 1 T PO TID 90 Tab 5    insulin aspart U-100 (NOVOLOG) 100 unit/mL (3 mL) inpn Inject 12 units under the skin before each meal three times daily. 5 Pen 5    flash glucose sensor (FreeStyle Josue 14 Day Sensor) kit 1 Each by Does Not Apply route Once every 2 weeks. 2 Kit 4    flash glucose scanning reader (FREESTYLE JOSUE 14 DAY READER) misc 1 Each by Does Not Apply route every fourteen (14) days. 1 Each 2    flash glucose sensor (FREESTYLE JOSUE 14 DAY SENSOR) kit 1 Each by Does Not Apply route Before breakfast, lunch, and dinner. 1 Kit 1    ondansetron (ZOFRAN ODT) 4 mg disintegrating tablet Take 1 Tab by mouth every eight (8) hours as needed for Nausea. 10 Tab 0     No current facility-administered medications on file prior to visit.        Past Surgical History:   Procedure Laterality Date    HX BACK SURGERY      L5    HX OTHER SURGICAL Bilateral     knee surgery    HX OTHER SURGICAL      thumb surgery for tendon repair       Family History   Problem Relation Age of Onset    Diabetes Mother     Cancer Father     Diabetes Maternal Aunt      Reviewed and no changes     Social History     Socioeconomic History    Marital status:      Spouse name: Not on file    Number of children: Not on file    Years of education: Not on file    Highest education level: Not on file   Occupational History    Not on file   Tobacco Use    Smoking status: Never Smoker    Smokeless tobacco: Never Used   Substance and Sexual Activity    Alcohol use: Never    Drug use: Never    Sexual activity: Not on file   Other Topics Concern    Not on file   Social History Narrative    Not on file     Social Determinants of Health     Financial Resource Strain:     Difficulty of Paying Living Expenses:    Food Insecurity:     Worried About Running Out of Food in the Last Year:     Ran Out of Food in the Last Year:    Transportation Needs:     Lack of Transportation (Medical):  Lack of Transportation (Non-Medical):    Physical Activity:     Days of Exercise per Week:     Minutes of Exercise per Session:    Stress:     Feeling of Stress :    Social Connections:     Frequency of Communication with Friends and Family:     Frequency of Social Gatherings with Friends and Family:     Attends Restorationist Services:     Active Member of Clubs or Organizations:     Attends Club or Organization Meetings:     Marital Status:    Intimate Partner Violence:     Fear of Current or Ex-Partner:     Emotionally Abused:     Physically Abused:     Sexually Abused: There were no vitals taken for this visit. Physical Examination:   Gen: well appearing male  HEENT: normal conjunctiva, no audible congestion, patient does not see oral erythema, has MMM  Neck: patient does not feel enlarged or tender LAD or masses  Resp: normal respiratory effort, no audible wheezing. CV: patient does not feel palpitations or heart irregularity  Abd: patient does not feel abdominal tenderness or mass, patient does not notice distension  Extrem: patient does not see swelling in ankles or joints.    Neuro: Alert and oriented, able to answer questions without difficulty, able to move all extremities and walk normally          Lab Results   Component Value Date/Time    WBC 9.2 02/04/2020 04:17 PM    HGB 14.8 02/04/2020 04:17 PM    HCT 45.5 02/04/2020 04:17 PM    PLATELET 034 80/39/1681 04:17 PM    MCV 81.1 02/04/2020 04:17 PM     Lab Results   Component Value Date/Time    Sodium 130 (L) 02/04/2020 04:17 PM    Potassium 4.9 02/04/2020 04:17 PM    Chloride 96 (L) 02/04/2020 04:17 PM    CO2 28 02/04/2020 04:17 PM    Anion gap 6 02/04/2020 04:17 PM    Glucose 606 (HH) 02/04/2020 04:17 PM    BUN 20 02/04/2020 04:17 PM    Creatinine 1.46 (H) 02/04/2020 04:17 PM    BUN/Creatinine ratio 14 02/04/2020 04:17 PM    GFR est AA >60 02/04/2020 04:17 PM    GFR est non-AA 51 (L) 02/04/2020 04:17 PM    Calcium 10.3 (H) 02/04/2020 04:17 PM     No results found for: CHOL, CHOLPOCT, CHOLX, CHLST, CHOLV, HDL, HDLPOC, HDLP, LDL, LDLCPOC, LDLC, DLDLP, VLDLC, VLDL, TGLX, TRIGL, TRIGP, TGLPOCT, CHHD, CHHDX  No results found for: TSH, TSH2, TSH3, TSHP, TSHEXT, TSHEXT  No results found for: PSA, Russell Crooked, [id number], [id number]  Lab Results   Component Value Date/Time    Hemoglobin A1c 10.0 (H) 06/17/2020 09:16 AM     No results found for: VITD3, XQVID2, XQVID3, XQVID, VD3RIA    Lab Results   Component Value Date/Time    ALT (SGPT) 31 02/04/2020 04:17 PM    Alk. phosphatase 121 (H) 02/04/2020 04:17 PM    Bilirubin, total 0.8 02/04/2020 04:17 PM           Assessment/Plan:    1. Type 1 diabetes mellitus with diabetic polyneuropathy (HCC)  - continue Tresiba 40 units daily   - insulin lispro 200 unit/mL (3 mL) inpn; 10 Units by SubCUTAneous route Before breakfast, lunch, and dinner. Dispense: 2 Pen; Refill: 2  - HEMOGLOBIN A1C WITH EAG  - MICROALBUMIN, UR, RAND W/ MICROALB/CREAT RATIO; Future  - LIPID PANEL; Future  - METABOLIC PANEL, COMPREHENSIVE; Future  - gabapentin (NEURONTIN) 800 mg tablet; TK 1 T PO TID  Dispense: 90 Tablet; Refill: 5  - CBC WITH AUTOMATED DIFF; Future    2. Encounter for hepatitis C screening test for low risk patient  - HEPATITIS C AB; Future        labs to be done prior to next appointment follow up in 3 months     Alejandra Beal MD    This is an established visit conducted via real time video and audio telemedicine. The patient has been instructed that this meets HIPAA criteria and acknowledges and agrees to this method of visitation.

## 2021-07-20 ENCOUNTER — TELEPHONE (OUTPATIENT)
Dept: INTERNAL MEDICINE CLINIC | Age: 51
End: 2021-07-20

## 2021-07-20 DIAGNOSIS — E10.42 TYPE 1 DIABETES MELLITUS WITH DIABETIC POLYNEUROPATHY (HCC): Primary | ICD-10-CM

## 2021-07-20 RX ORDER — INSULIN LISPRO 100 [IU]/ML
12 INJECTION, SOLUTION INTRAVENOUS; SUBCUTANEOUS
Qty: 4 PACKAGE | Refills: 1 | Status: SHIPPED | OUTPATIENT
Start: 2021-07-20 | End: 2021-09-03 | Stop reason: SDUPTHER

## 2021-07-20 NOTE — TELEPHONE ENCOUNTER
The insulin we sent in is not covered. The drug you are requesting prior authorization for is not covered. Please select an alternative drug from the choices provided and send in a new prescription for that drug. You may also select \"Complete PA\" to obtain a PA for the originally requested drug.    HUMALOG KWIKPEN U100 3ML 5'S 100U/ML   INSULIN LISPRO(BRAND)[id number] 100U/ML   HUMALOG KWIKPEN U100 3ML 5'S 100U/ML   INSULIN LISPRO(BRAND)[id number] 100U/ML

## 2021-07-20 NOTE — TELEPHONE ENCOUNTER
Patient is currently prescribed Lispro Insulin and Tresiba. This writer contacted [facility]. This writer spoke with Jaz Pena, pharmacist, whom advised this writer that a prior authorization is not required for Lispro Insulin or Tresiba. No further action is required.

## 2021-07-27 ENCOUNTER — APPOINTMENT (OUTPATIENT)
Dept: GENERAL RADIOLOGY | Age: 51
End: 2021-07-27
Attending: EMERGENCY MEDICINE
Payer: COMMERCIAL

## 2021-07-27 ENCOUNTER — HOSPITAL ENCOUNTER (EMERGENCY)
Age: 51
Discharge: HOME | End: 2021-07-27
Attending: EMERGENCY MEDICINE | Admitting: EMERGENCY MEDICINE
Payer: COMMERCIAL

## 2021-07-27 VITALS
OXYGEN SATURATION: 98 % | RESPIRATION RATE: 16 BRPM | HEIGHT: 72 IN | TEMPERATURE: 98.2 F | DIASTOLIC BLOOD PRESSURE: 70 MMHG | BODY MASS INDEX: 21.67 KG/M2 | HEART RATE: 83 BPM | SYSTOLIC BLOOD PRESSURE: 112 MMHG | WEIGHT: 160 LBS

## 2021-07-27 DIAGNOSIS — S61.412A LACERATION OF LEFT HAND WITHOUT FOREIGN BODY, INITIAL ENCOUNTER: Primary | ICD-10-CM

## 2021-07-27 PROCEDURE — 74011000250 HC RX REV CODE- 250: Performed by: EMERGENCY MEDICINE

## 2021-07-27 PROCEDURE — 99282 EMERGENCY DEPT VISIT SF MDM: CPT

## 2021-07-27 PROCEDURE — 75810000293 HC SIMP/SUPERF WND  RPR

## 2021-07-27 PROCEDURE — 73130 X-RAY EXAM OF HAND: CPT

## 2021-07-27 PROCEDURE — 74011250637 HC RX REV CODE- 250/637: Performed by: EMERGENCY MEDICINE

## 2021-07-27 RX ORDER — CEPHALEXIN 500 MG/1
500 CAPSULE ORAL 4 TIMES DAILY
Qty: 28 CAPSULE | Refills: 0 | Status: SHIPPED | OUTPATIENT
Start: 2021-07-27 | End: 2021-08-03

## 2021-07-27 RX ORDER — CEPHALEXIN 500 MG/1
1000 CAPSULE ORAL
Status: DISCONTINUED | OUTPATIENT
Start: 2021-07-27 | End: 2021-07-27 | Stop reason: HOSPADM

## 2021-07-27 RX ORDER — CEPHALEXIN 500 MG/1
1000 CAPSULE ORAL
Status: COMPLETED | OUTPATIENT
Start: 2021-07-27 | End: 2021-07-27

## 2021-07-27 RX ORDER — LIDOCAINE HYDROCHLORIDE AND EPINEPHRINE 10; 10 MG/ML; UG/ML
1.5 INJECTION, SOLUTION INFILTRATION; PERINEURAL ONCE
Status: COMPLETED | OUTPATIENT
Start: 2021-07-27 | End: 2021-07-27

## 2021-07-27 RX ADMIN — CEPHALEXIN 1000 MG: 500 CAPSULE ORAL at 15:43

## 2021-07-27 RX ADMIN — LIDOCAINE HYDROCHLORIDE,EPINEPHRINE BITARTRATE 15 MG: 10; .01 INJECTION, SOLUTION INFILTRATION; PERINEURAL at 15:28

## 2021-07-27 NOTE — ED PROVIDER NOTES
HPI patient is a 59-year-old insulin-dependent diabetic with diabetic neuropathy who presents to the ED with a left hand laceration that he sustained at work around 9:30 AM this morning. He works for [employer] and was using a  when it slipped and cut the webspace of the left hand between the left thumb and index finger. Bleeding is controlled. There is no obvious bony deformity; bruising or extending erythema. Good neurovascular sensation. No apparent tendon or nerve injury. Tetanus booster was within 5 years. He has not had any pain medications today prior to arrival      Past Medical History:   Diagnosis Date    Diabetes ([facility] [address]     Neuropathy        Past Surgical History:   Procedure Laterality Date    HX BACK SURGERY      L5    HX OTHER SURGICAL Bilateral     knee surgery    HX OTHER SURGICAL      thumb surgery for tendon repair         Family History:   Problem Relation Age of Onset    Diabetes Mother     Cancer Father     Diabetes Maternal Aunt        Social History     Socioeconomic History    Marital status:      Spouse name: Not on file    Number of children: Not on file    Years of education: Not on file    Highest education level: Not on file   Occupational History    Not on file   Tobacco Use    Smoking status: Never Smoker    Smokeless tobacco: Never Used   Substance and Sexual Activity    Alcohol use: Never    Drug use: Never    Sexual activity: Not on file   Other Topics Concern    Not on file   Social History Narrative    Not on file     Social Determinants of Health     Financial Resource Strain:     Difficulty of Paying Living Expenses:    Food Insecurity:     Worried About Running Out of Food in the Last Year:     [address] in the Last Year:    Transportation Needs:     Lack of Transportation (Medical):      Lack of Transportation (Non-Medical):    Physical Activity:     Days of Exercise per Week:     Minutes of Exercise per Session:    Stress:     Feeling of Stress :    Social Connections:     Frequency of Communication with Friends and Family:     Frequency of Social Gatherings with Friends and Family:     Attends Tenriism Services:     Active Member of Clubs or Organizations:     Attends Club or Organization Meetings:     Marital Status:    Intimate Partner Violence:     Fear of Current or Ex-Partner:     Emotionally Abused:     Physically Abused:     Sexually Abused: ALLERGIES: Patient has no known allergies. Review of Systems   Constitutional: Negative for activity change, appetite change, fever and unexpected weight change. HENT: Negative for congestion, rhinorrhea and sore throat. Eyes: Negative for visual disturbance. Respiratory: Negative for cough and shortness of breath. Cardiovascular: Negative for chest pain, palpitations and leg swelling. Gastrointestinal: Negative for abdominal pain, constipation, diarrhea, nausea and vomiting. Genitourinary: Negative for dysuria and flank pain. Musculoskeletal: Negative for arthralgias, gait problem and myalgias. Skin: Positive for wound. Neurological: Negative for dizziness, light-headedness and headaches. All other systems reviewed and are negative. Vitals:    07/27/21 1440   BP: 112/70   Pulse: 83   Resp: 16   Temp: 98.2 °F (36.8 °C)   SpO2: 98%   Weight: 72.6 kg (160 lb)   Height: 6' (1.829 m)            Physical Exam  Vitals and nursing note reviewed. Constitutional:       General: He is not in acute distress. Appearance: Normal appearance. He is normal weight. He is not ill-appearing, toxic-appearing or diaphoretic. Comments: Male, insulin-dependent diabetic type I, smoker     HENT:      Head: Normocephalic. Cardiovascular:      Rate and Rhythm: Normal rate and regular rhythm. Pulmonary:      Effort: Pulmonary effort is normal.      Breath sounds: Normal breath sounds.    Musculoskeletal:         General: Tenderness and signs of injury present. No swelling or deformity. Comments: Jagged laceration on the left hand between the left thumb and left index finger in the webspace; bleeding is controlled. There is no obvious bony deformity. Good neurovascular sensation. No apparent tendon or nerve injury. Skin:     General: Skin is warm and dry. Neurological:      General: No focal deficit present. Mental Status: He is alert and oriented to person, place, and time. MDM       Wound Repair    Date/Time: 7/27/2021 3:37 PM  Performed by: NPSupervising provider: Dr. Daniel Thurston  Preparation: skin prepped with Shur-Clens  Location details: left hand  Wound length:2.6 - 7.5 cm (3.5cm jagged laceration)  Anesthesia: local infiltration    Anesthesia:  Local Anesthetic: lidocaine 1% with epinephrine  Foreign bodies: no foreign bodies  Irrigation solution: saline  Irrigation method: syringe  Debridement: minimal  Skin closure: 4-0 nylon  Number of sutures: 5  Technique: interrupted and simple  Approximation: close  Dressing: antibiotic ointment and pressure dressing  Patient tolerance: patient tolerated the procedure well with no immediate complications  My total time at bedside, performing this procedure was 16-30 minutes. Comments: Wound care instructions were reviewed with the patient; good neurovascular sensation before and after the wound care procedure. Ramón Selby NP          XR HAND LT MIN 3 V    Result Date: 7/27/2021  No radiographically apparent foreign body. Reviewed skin recommendations with  Fatou Graham. Follow up with your PCP for further evaluation and suture removal. Use only unscented soaps and lotions. 3:39 PM  Patient's results and plan of care have been reviewed with him.   Patient has verbally conveyed his understanding and agreement of his signs, symptoms, diagnosis, treatment and prognosis and additionally agrees to follow up as recommended or return to the Emergency Room should his condition change prior to follow-up. Discharge instructions have also been provided to the patient with some educational information regarding his diagnosis as well a list of reasons why he would want to return to the ER prior to his follow-up appointment should his condition change. Pamela Portillo NP

## 2021-07-27 NOTE — ED TRIAGE NOTES
Cut L hand on razor blade at work around [address] of last tdap. Type 1 DM, has not been compliant with insulin recently.

## 2021-07-29 ENCOUNTER — TELEPHONE (OUTPATIENT)
Dept: INTERNAL MEDICINE CLINIC | Age: 51
End: 2021-07-29

## 2021-07-29 DIAGNOSIS — S61.219A CUT OF FINGER: Primary | ICD-10-CM

## 2021-07-29 NOTE — TELEPHONE ENCOUNTER
----- Message from [employer] sent at 7/29/2021  4:02 PM EDT -----  Regarding: Dr. Mace Mohs: [phone]  General Message/Vendor Calls    Caller's first and last name: Rocío Collier, Wife. Reason for call: Needs doctors note for today, pt has cut on hand, ended up with stitches. Called yesterday was told to call back today. Also needs pain medication. Has not been seen by pcp. Callback required yes/no and why: Yes, needs doctors note and Rx.        Best contact number(s): [phone]      Message from [facility]

## 2021-07-29 NOTE — LETTER
NOTIFICATION RETURN TO WORK / SCHOOL    7/30/2021 5:02 PM    Mr. Skyla Borges [id number]      To Whom It May Concern:    Gia Guillermo is currently under the care of [facility]. Please excuse patient 07/29 and 07/30 from work due to acute illness. If there are questions or concerns please have the patient contact our office.         Sincerely,      Osei Washington MD

## 2021-07-30 RX ORDER — OXYCODONE AND ACETAMINOPHEN 10; 325 MG/1; MG/1
1 TABLET ORAL
Qty: 8 TABLET | Refills: 0 | Status: SHIPPED | OUTPATIENT
Start: 2021-07-30 | End: 2021-08-02

## 2021-08-01 ENCOUNTER — HOSPITAL ENCOUNTER (EMERGENCY)
Age: 51
Discharge: LEFT BEFORE DISCHARGE | End: 2021-08-01
Payer: COMMERCIAL

## 2021-08-01 VITALS
SYSTOLIC BLOOD PRESSURE: 149 MMHG | RESPIRATION RATE: 18 BRPM | HEART RATE: 88 BPM | DIASTOLIC BLOOD PRESSURE: 85 MMHG | OXYGEN SATURATION: 98 % | TEMPERATURE: 97.9 F

## 2021-08-01 PROCEDURE — 75810000275 HC EMERGENCY DEPT VISIT NO LEVEL OF CARE

## 2021-08-01 NOTE — ED TRIAGE NOTES
TRIAGE NOTE:  Patient arrives ambulatory with c/o left hand pain, redness and swelling. Patient had laceration to left hand and was seen on 7/29 and had 5 stitches placed. Patient denies fever and chills.

## 2021-08-02 NOTE — TELEPHONE ENCOUNTER
This writer contacted patient's wife Zaid Pierce, whom was confirmed on HIPAA documentation to disclose all medical information. Two patient identifiers was confirmed with patient's spouse. Mrs. Cline was advised work note was completed and available in the office for . Mrs Kings Zuleta verbalized understanding and will inform patient. Mrs. Cline also verbalized patient receipt of prescription for pain medication. Denies further questions or concerns at this time.

## 2021-08-16 DIAGNOSIS — E10.42 TYPE 1 DIABETES MELLITUS WITH DIABETIC POLYNEUROPATHY (HCC): ICD-10-CM

## 2021-08-16 RX ORDER — GABAPENTIN 800 MG/1
TABLET ORAL
Qty: 90 TABLET | Refills: 5 | Status: SHIPPED | OUTPATIENT
Start: 2021-08-16 | End: 2022-01-12 | Stop reason: SDUPTHER

## 2021-08-26 DIAGNOSIS — E10.42 TYPE 1 DIABETES MELLITUS WITH DIABETIC POLYNEUROPATHY (HCC): ICD-10-CM

## 2021-08-27 RX ORDER — FLASH GLUCOSE SCANNING READER
1 EACH MISCELLANEOUS
Qty: 1 EACH | Refills: 2 | Status: SHIPPED | OUTPATIENT
Start: 2021-08-27

## 2021-08-27 NOTE — TELEPHONE ENCOUNTER
PCP: Yassine Juarez MD    Last appt: 2020  Future Appointments   Date Time Provider Abdelrahman Meraz   10/18/2021  9:15 AM Appa Xavier Villatoro MD [facility] BS AMB       Requested Prescriptions     Pending Prescriptions Disp Refills    flash glucose scanning reader (FreeStyle Josue 14 Day [place]) misc 1 Each 2     Si Each by Does Not Apply route every fourteen (14) days.

## 2021-09-03 ENCOUNTER — OFFICE VISIT (OUTPATIENT)
Dept: URGENT CARE | Age: 51
End: 2021-09-03
Payer: COMMERCIAL

## 2021-09-03 VITALS — RESPIRATION RATE: 18 BRPM | HEART RATE: 99 BPM | OXYGEN SATURATION: 96 % | TEMPERATURE: 98.5 F

## 2021-09-03 DIAGNOSIS — E10.42 TYPE 1 DIABETES MELLITUS WITH DIABETIC POLYNEUROPATHY (HCC): ICD-10-CM

## 2021-09-03 DIAGNOSIS — Z20.822 EXPOSURE TO COVID-19 VIRUS: Primary | ICD-10-CM

## 2021-09-03 LAB — SARS-COV-2 POC: NEGATIVE

## 2021-09-03 PROCEDURE — 87426 SARSCOV CORONAVIRUS AG IA: CPT | Performed by: FAMILY MEDICINE

## 2021-09-03 PROCEDURE — 99202 OFFICE O/P NEW SF 15 MIN: CPT | Performed by: FAMILY MEDICINE

## 2021-09-03 RX ORDER — INSULIN DEGLUDEC INJECTION 100 U/ML
INJECTION, SOLUTION SUBCUTANEOUS
Qty: 12 PEN | Refills: 1 | Status: SHIPPED | OUTPATIENT
Start: 2021-09-03 | End: 2022-06-22 | Stop reason: SDUPTHER

## 2021-09-03 NOTE — TELEPHONE ENCOUNTER
----- Message from Sammi Sullivan sent at 9/3/2021  1:07 PM EDT -----  Regarding: Prescription Question  Contact: [phone]  [facility] is requiring a 90 day rx for my Tresiba. I called the office yesterday but never got a reply. I used the last of my [place] last night and don't have any more long term insulin left. My pharmacy is [facility] at [address] [place] [address]. My phone is suspended at the moment so the only way to reach me is on here. I will keep checking with the pharmacy to see if your office sent the new rx.  Thanks, Sammi Sullivan

## 2021-09-03 NOTE — PROGRESS NOTES
This patient was seen at [address] [place] [address] Urgent Care while in their vehicle due to COVID-19 pandemic with PPE and focused examination in order to decrease community viral transmission. The patient/guardian gave verbal consent to treat. The history is provided by the patient. asymptomatic  H/o exposure to covid 1 week before       Past Medical History:   Diagnosis Date    Diabetes ([address]     Neuropathy         Past Surgical History:   Procedure Laterality Date    HX BACK SURGERY      L5    HX OTHER SURGICAL Bilateral     knee surgery    HX OTHER SURGICAL      thumb surgery for tendon repair         Family History   Problem Relation Age of Onset    Diabetes Mother     Cancer Father     Diabetes Maternal Aunt         Social History     Socioeconomic History    Marital status:      Spouse name: Not on file    Number of children: Not on file    Years of education: Not on file    Highest education level: Not on file   Occupational History    Not on file   Tobacco Use    Smoking status: Never Smoker    Smokeless tobacco: Never Used   Substance and Sexual Activity    Alcohol use: Never    Drug use: Never    Sexual activity: Not on file   Other Topics Concern    Not on file   Social History Narrative    Not on file     Social Determinants of Health     Financial Resource Strain:     Difficulty of Paying Living Expenses:    Food Insecurity:     Worried About Running Out of Food in the Last Year:     [address] in the Last Year:    Transportation Needs:     Lack of Transportation (Medical):      Lack of Transportation (Non-Medical):    Physical Activity:     Days of Exercise per Week:     Minutes of Exercise per Session:    Stress:     Feeling of Stress :    Social Connections:     Frequency of Communication with Friends and Family:     Frequency of Social Gatherings with Friends and Family:     Attends Temple Services:     Active Member of Clubs or Organizations:  Attends Club or Organization Meetings:     Marital Status:    Intimate Partner Violence:     Fear of Current or Ex-Partner:     Emotionally Abused:     Physically Abused:     Sexually Abused: ALLERGIES: Patient has no known allergies. Review of Systems   All other systems reviewed and are negative. Vitals:    09/03/21 1317   Pulse: 99   Resp: 18   Temp: 98.5 °F (36.9 °C)   SpO2: 96%       Physical Exam  Vitals and nursing note reviewed. Constitutional:       General: He is not in acute distress. Appearance: He is not ill-appearing. Pulmonary:      Effort: Pulmonary effort is normal. No respiratory distress. Breath sounds: Normal breath sounds. MDM    Procedures        ICD-10-CM ICD-9-CM    1. Exposure to COVID-19 virus  Z20.822 V01.79 AMB POC SARS-COV-2     No orders of the defined types were placed in this encounter. Results for orders placed or performed in visit on 09/03/21   AMB POC SARS-COV-2   Result Value Ref Range    SARS-COV-2 POC Negative Negative     The patients condition was discussed with the patient and they understand. The patient is to follow up with primary care doctor. If signs and symptoms become worse the pt is to go to the ER. The patient is to take medications as prescribed.

## 2021-09-03 NOTE — TELEPHONE ENCOUNTER
Future Appointments:  Future Appointments   Date Time Provider Abdelrahman Mariya   10/18/2021  9:15 AM Yina Brooks MD [facility] BS AMB        Last Appointment With Me:  7/16/2021     Requested Prescriptions     Pending Prescriptions Disp Refills    insulin degludec Shlomo Baltazar FlexTouch U-100) 100 unit/mL (3 mL) inpn 5 Pen 5     Sig: INJECT 40 UNITS UNDER THE SKIN ONCE DAILY

## 2022-01-11 ENCOUNTER — PATIENT MESSAGE (OUTPATIENT)
Dept: INTERNAL MEDICINE CLINIC | Age: 52
End: 2022-01-11

## 2022-01-12 DIAGNOSIS — E10.42 TYPE 1 DIABETES MELLITUS WITH DIABETIC POLYNEUROPATHY (HCC): ICD-10-CM

## 2022-01-12 RX ORDER — GABAPENTIN 800 MG/1
TABLET ORAL
Qty: 90 TABLET | Refills: 5 | Status: SHIPPED | OUTPATIENT
Start: 2022-01-12 | End: 2022-01-13 | Stop reason: SDUPTHER

## 2022-01-12 NOTE — TELEPHONE ENCOUNTER
Future Appointments:  Future Appointments   Date Time Provider Abdelrahman Meraz   1/13/2022  2:00 PM Appa Denis Li MD [facility] BS AMB        Last Appointment With Me:  10/18/2021     Requested Prescriptions     Pending Prescriptions Disp Refills    gabapentin (NEURONTIN) 800 mg tablet 90 Tablet 5     Sig: TK 1 T PO TID     Signed By: Kenya Rowley LPN     January 12, 8352

## 2022-01-13 ENCOUNTER — VIRTUAL VISIT (OUTPATIENT)
Dept: INTERNAL MEDICINE CLINIC | Age: 52
End: 2022-01-13
Payer: COMMERCIAL

## 2022-01-13 DIAGNOSIS — Z12.5 SCREENING PSA (PROSTATE SPECIFIC ANTIGEN): ICD-10-CM

## 2022-01-13 DIAGNOSIS — R20.9 BILATERAL COLD FEET: ICD-10-CM

## 2022-01-13 DIAGNOSIS — E10.42 TYPE 1 DIABETES MELLITUS WITH DIABETIC POLYNEUROPATHY (HCC): Primary | ICD-10-CM

## 2022-01-13 DIAGNOSIS — Z12.11 SCREENING FOR COLON CANCER: ICD-10-CM

## 2022-01-13 PROCEDURE — 99214 OFFICE O/P EST MOD 30 MIN: CPT | Performed by: INTERNAL MEDICINE

## 2022-01-13 RX ORDER — GABAPENTIN 800 MG/1
TABLET ORAL
Qty: 90 TABLET | Refills: 5 | Status: SHIPPED | OUTPATIENT
Start: 2022-01-13 | End: 2022-08-02 | Stop reason: SDUPTHER

## 2022-01-13 NOTE — PROGRESS NOTES
CC: Back Pain, Referral Request (Vascular), and Dizziness      HPI:    He is a 46 y.o. male who presents for evaluation of     He was  last seen  Approximately one year ago       Patient has type 1 diabetes      Currently taking 40 units of tresiba  Short acting patient takes about 12 units   Typically checks prior to eating  Blood sugar in the 130 range fasting 200 to 250 other times ( often misses the short acting)   Taking gabapentin for neuropathy  Overdue for eye exam    Complains of cold feet and wonders if he has vascular peripheral disease/ smokes one cigar a day     Had severe back pain and last worked last Wednesday - back pain is improving   Took tylenol feels pain is better and advised to return to work    This is an established visit conducted via telemedicine with video. The patient has been instructed that this meets HIPAA criteria and acknowledges and agrees to this method of visitation. Pursuant to the emergency declaration under the [facility], [facility] waiver authority and the [employer] Act, this Virtual Visit was conducted, with patient's consent, to reduce the patient's risk of exposure to COVID-19 and provide continuity of care for an established patient. Services were provided through a video synchronous discussion virtually to substitute for in-person clinic visit. ROS:  Constitutional: negative for fevers, chills, anorexia and weight loss  10 systems reviewed and negative other than HPI     Past Medical History:   Diagnosis Date    Diabetes ([facility] [address]     Neuropathy        Current Outpatient Medications on File Prior to Visit   Medication Sig Dispense Refill    gabapentin (NEURONTIN) 800 mg tablet TK 1 T PO TID 90 Tablet 5    insulin lispro (HumaLOG KwikPen Insulin) 100 unit/mL kwikpen 12 Units by SubCUTAneous route Before breakfast, lunch, and dinner.  4 Pen 2    insulin degludec Ap Edilma FlexTouch U-100) 100 unit/mL (3 mL) inpn INJECT 40 UNITS UNDER THE SKIN ONCE DAILY 12 Pen 1    flash glucose scanning reader (FreeStyle Josue 14 Day [place]) misc 1 Each by Does Not Apply route every fourteen (14) days. (Patient not taking: Reported on 1/13/2022) 1 Each 2    Insulin Needles, Disposable, 31 gauge x 5/16\" ndle by SubCUTAneous route four (4) times daily (with meals). 1 Package 11    Insulin Needles, Disposable, 31 gauge x 5/16\" ndle  100 units 1 Package 11    ondansetron (ZOFRAN ODT) 4 mg disintegrating tablet Take 1 Tab by mouth every eight (8) hours as needed for Nausea. 10 Tab 0    flash glucose sensor (FreeStyle Josue 14 Day Sensor) kit 1 Each by Does Not Apply route Once every 2 weeks. 2 Kit 4    flash glucose sensor (FREESTYLE JOSUE 14 DAY SENSOR) kit 1 Each by Does Not Apply route Before breakfast, lunch, and dinner. (Patient not taking: Reported on 1/13/2022) 1 Kit 1     No current facility-administered medications on file prior to visit.        Past Surgical History:   Procedure Laterality Date    HX BACK SURGERY      L5    HX OTHER SURGICAL Bilateral     knee surgery    HX OTHER SURGICAL      thumb surgery for tendon repair       Family History   Problem Relation Age of Onset    Diabetes Mother     Cancer Father     Diabetes Maternal Aunt      Reviewed and no changes     Social History     Socioeconomic History    Marital status:      Spouse name: Not on file    Number of children: Not on file    Years of education: Not on file    Highest education level: Not on file   Occupational History    Not on file   Tobacco Use    Smoking status: Never Smoker    Smokeless tobacco: Never Used   Substance and Sexual Activity    Alcohol use: Never    Drug use: Never    Sexual activity: Not on file   Other Topics Concern    Not on file   Social History Narrative    Not on file     Social Determinants of Health     Financial Resource Strain:     Difficulty of Paying Living Expenses: Not on file   Food Insecurity:     Worried About Running Out of Food in the Last Year: Not on file    Tomi of Food in the Last Year: Not on file   Transportation Needs:     Lack of Transportation (Medical): Not on file    Lack of Transportation (Non-Medical): Not on file   Physical Activity:     Days of Exercise per Week: Not on file    Minutes of Exercise per Session: Not on file   Stress:     Feeling of Stress : Not on file   Social Connections:     Frequency of Communication with Friends and Family: Not on file    Frequency of Social Gatherings with Friends and Family: Not on file    Attends Adventist Services: Not on file    Active Member of [address] [place] [address] or Organizations: Not on file    Attends Club or Organization Meetings: Not on file    Marital Status: Not on file   Intimate Partner Violence:     Fear of Current or Ex-Partner: Not on file    Emotionally Abused: Not on file    Physically Abused: Not on file    Sexually Abused: Not on file   Housing Stability:     Unable to Pay for Housing in the Last Year: Not on file    Number of [place] in the Last Year: Not on file    Unstable Housing in the Last Year: Not on file          There were no vitals taken for this visit. Physical Examination:   Gen: well appearing male  HEENT: normal conjunctiva, no audible congestion, patient does not see oral erythema, has MMM  Neck: patient does not feel enlarged or tender LAD or masses  Resp: normal respiratory effort, no audible wheezing. CV: patient does not feel palpitations or heart irregularity  Abd: patient does not feel abdominal tenderness or mass, patient does not notice distension  Extrem: patient does not see swelling in ankles or joints.    Neuro: Alert and oriented, able to answer questions without difficulty, able to move all extremities and walk normally          Lab Results   Component Value Date/Time    WBC 9.2 02/04/2020 04:17 PM    HGB 14.8 02/04/2020 04:17 PM    HCT 45.5 02/04/2020 04:17 PM    PLATELET 348 49/43/0626 04:17 PM    MCV 81.1 02/04/2020 04:17 PM     Lab Results   Component Value Date/Time    Sodium 130 (L) 02/04/2020 04:17 PM    Potassium 4.9 02/04/2020 04:17 PM    Chloride 96 (L) 02/04/2020 04:17 PM    CO2 28 02/04/2020 04:17 PM    Anion gap 6 02/04/2020 04:17 PM    Glucose 606 (HH) 02/04/2020 04:17 PM    BUN 20 02/04/2020 04:17 PM    Creatinine 1.46 (H) 02/04/2020 04:17 PM    BUN/Creatinine ratio 14 02/04/2020 04:17 PM    GFR est AA >60 02/04/2020 04:17 PM    GFR est non-AA 51 (L) 02/04/2020 04:17 PM    Calcium 10.3 (H) 02/04/2020 04:17 PM     No results found for: CHOL, CHOLPOCT, CHOLX, CHLST, CHOLV, HDL, HDLPOC, HDLP, LDL, LDLCPOC, LDLC, DLDLP, VLDLC, VLDL, TGLX, TRIGL, TRIGP, TGLPOCT, CHHD, CHHDX  No results found for: TSH, TSH2, TSH3, TSHP, TSHEXT, TSHEXT  No results found for: PSA, Sherle Pat, PSAR3, [id number], [id number]  Lab Results   Component Value Date/Time    Hemoglobin A1c 10.0 (H) 06/17/2020 09:16 AM     No results found for: VITD3, XQVID2, XQVID3, Ally Prima, VD3RIA    Lab Results   Component Value Date/Time    ALT (SGPT) 31 02/04/2020 04:17 PM    Alk. phosphatase 121 (H) 02/04/2020 04:17 PM    Bilirubin, total 0.8 02/04/2020 04:17 PM           Assessment/Plan:    1. Type 1 diabetes mellitus with diabetic polyneuropathy (HCC)  - continue Tresiba 40 units daily   - insulin lispro 200 unit/mL (3 mL) inpn; 10 Units by SubCUTAneous route Before breakfast, lunch, and dinner. Dispense: 2 Pen; Refill: 2  - HEMOGLOBIN A1C WITH EAG  - MICROALBUMIN, UR, RAND W/ MICROALB/CREAT RATIO; Future  - LIPID PANEL; Future  - METABOLIC PANEL, COMPREHENSIVE; Future  - gabapentin (NEURONTIN) 800 mg tablet; TK 1 T PO TID  Dispense: 90 Tablet; Refill: 5  - CBC WITH AUTOMATED DIFF; Future    2.  Cold feet: ordered ABIs         labs to be done prior to next appointment follow up in 3 months     Darylene Frederic, MD    This is an established visit conducted via real time video and audio telemedicine. The patient has been instructed that this meets HIPAA criteria and acknowledges and agrees to this method of visitation.

## 2022-01-13 NOTE — LETTER
NOTIFICATION RETURN TO WORK / SCHOOL    1/13/2022 2:35 PM    Mr. Levander Blizzard [id number]      To Whom It May Concern:    Nhi Ruiz is currently under the care of [facility]. He will return to work/school on:01/14/2021  Please excuse patient from January 8th to January 13th       If there are questions or concerns please have the patient contact our office.         Sincerely,      Lamar Lovell MD

## 2022-01-13 NOTE — PROGRESS NOTES
Two pt identifiers confirmed. Reyes Chisholm (: 1970) is a 46 y.o. male, established patient, here for evaluation of the following chief complaint(s):   Back Pain, Referral Request (Vascular), and Dizziness       ASSESSMENT/PLAN:  Below is the assessment and plan developed based on review of pertinent history, labs, studies, and medications. No follow-ups on file. Reyes Chisholm, was evaluated through a synchronous (real-time) audio-video encounter. The patient (or guardian if applicable) is aware that this is a billable service. Verbal consent to proceed has been obtained within the past 12 months. The visit was conducted pursuant to the emergency declaration under the [address] [place] [address] authority and the [employer] and [employer] Act. Patient identification was verified, and a caregiver was present when appropriate. The patient was located in a state where the provider was credentialed to provide care. An electronic signature was used to authenticate this note.   -- Cheryl Elizabeth LPN

## 2022-01-14 ENCOUNTER — LAB ONLY (OUTPATIENT)
Dept: INTERNAL MEDICINE CLINIC | Age: 52
End: 2022-01-14

## 2022-01-14 DIAGNOSIS — Z12.5 SCREENING PSA (PROSTATE SPECIFIC ANTIGEN): ICD-10-CM

## 2022-01-14 DIAGNOSIS — E10.42 TYPE 1 DIABETES MELLITUS WITH DIABETIC POLYNEUROPATHY (HCC): ICD-10-CM

## 2022-01-15 LAB
ALBUMIN SERPL-MCNC: 3 G/DL (ref 3.5–5)
ALBUMIN/GLOB SERPL: 1.4 {RATIO} (ref 1.1–2.2)
ALP SERPL-CCNC: 136 U/L (ref 45–117)
ALT SERPL-CCNC: 28 U/L (ref 12–78)
ANION GAP SERPL CALC-SCNC: 3 MMOL/L (ref 5–15)
AST SERPL-CCNC: 16 U/L (ref 15–37)
BASOPHILS # BLD: 0.1 K/UL (ref 0–0.1)
BASOPHILS NFR BLD: 1 % (ref 0–1)
BILIRUB SERPL-MCNC: 0.3 MG/DL (ref 0.2–1)
BUN SERPL-MCNC: 27 MG/DL (ref 6–20)
BUN/CREAT SERPL: 27 (ref 12–20)
CALCIUM SERPL-MCNC: 8.6 MG/DL (ref 8.5–10.1)
CHLORIDE SERPL-SCNC: 103 MMOL/L (ref 97–108)
CHOLEST SERPL-MCNC: 147 MG/DL
CO2 SERPL-SCNC: 29 MMOL/L (ref 21–32)
CREAT SERPL-MCNC: 0.99 MG/DL (ref 0.7–1.3)
CREAT UR-MCNC: 138 MG/DL
DIFFERENTIAL METHOD BLD: NORMAL
EOSINOPHIL # BLD: 0.2 K/UL (ref 0–0.4)
EOSINOPHIL NFR BLD: 3 % (ref 0–7)
ERYTHROCYTE [DISTWIDTH] IN BLOOD BY AUTOMATED COUNT: 13.7 % (ref 11.5–14.5)
EST. AVERAGE GLUCOSE BLD GHB EST-MCNC: 280 MG/DL
GLOBULIN SER CALC-MCNC: 2.2 G/DL (ref 2–4)
GLUCOSE SERPL-MCNC: 330 MG/DL (ref 65–100)
HBA1C MFR BLD: 11.4 % (ref 4–5.6)
HCT VFR BLD AUTO: 44.2 % (ref 36.6–50.3)
HDLC SERPL-MCNC: 52 MG/DL
HDLC SERPL: 2.8 {RATIO} (ref 0–5)
HGB BLD-MCNC: 14.1 G/DL (ref 12.1–17)
IMM GRANULOCYTES # BLD AUTO: 0 K/UL (ref 0–0.04)
IMM GRANULOCYTES NFR BLD AUTO: 0 % (ref 0–0.5)
LDLC SERPL CALC-MCNC: 70.8 MG/DL (ref 0–100)
LYMPHOCYTES # BLD: 1.9 K/UL (ref 0.8–3.5)
LYMPHOCYTES NFR BLD: 22 % (ref 12–49)
MCH RBC QN AUTO: 26.7 PG (ref 26–34)
MCHC RBC AUTO-ENTMCNC: 31.9 G/DL (ref 30–36.5)
MCV RBC AUTO: 83.7 FL (ref 80–99)
MICROALBUMIN UR-MCNC: 0.51 MG/DL
MICROALBUMIN/CREAT UR-RTO: 4 MG/G (ref 0–30)
MONOCYTES # BLD: 0.6 K/UL (ref 0–1)
MONOCYTES NFR BLD: 7 % (ref 5–13)
NEUTS SEG # BLD: 6 K/UL (ref 1.8–8)
NEUTS SEG NFR BLD: 67 % (ref 32–75)
NRBC # BLD: 0 K/UL (ref 0–0.01)
NRBC BLD-RTO: 0 PER 100 WBC
PLATELET # BLD AUTO: 295 K/UL (ref 150–400)
PMV BLD AUTO: 9.6 FL (ref 8.9–12.9)
POTASSIUM SERPL-SCNC: 5.3 MMOL/L (ref 3.5–5.1)
PROT SERPL-MCNC: 5.2 G/DL (ref 6.4–8.2)
RBC # BLD AUTO: 5.28 M/UL (ref 4.1–5.7)
SODIUM SERPL-SCNC: 135 MMOL/L (ref 136–145)
TRIGL SERPL-MCNC: 121 MG/DL (ref ?–150)
VLDLC SERPL CALC-MCNC: 24.2 MG/DL
WBC # BLD AUTO: 8.9 K/UL (ref 4.1–11.1)

## 2022-01-15 NOTE — PROGRESS NOTES
Blood sugar remains uncontrolled   Sue can you assist me with patient   Increase tresiba to 44 units daily

## 2022-01-17 LAB
PSA SERPL-MCNC: 0.3 NG/ML (ref 0–4)
REFLEX CRITERIA: NORMAL

## 2022-04-19 ENCOUNTER — TELEPHONE (OUTPATIENT)
Dept: INTERNAL MEDICINE CLINIC | Age: 52
End: 2022-04-19

## 2022-04-19 NOTE — TELEPHONE ENCOUNTER
Patient is requesting to change PCP from Dr. Keanu Duron to Dr. Donavon Hernandez. If approved, patient can be contacted at [phone] to schedule.

## 2022-04-21 ENCOUNTER — TELEPHONE (OUTPATIENT)
Dept: INTERNAL MEDICINE CLINIC | Age: 52
End: 2022-04-21

## 2022-04-21 DIAGNOSIS — R19.5 POSITIVE COLORECTAL CANCER SCREENING USING COLOGUARD TEST: Primary | ICD-10-CM

## 2022-04-21 NOTE — TELEPHONE ENCOUNTER
We do have the report. I have printed it for the doctor to review. Please see report. It was Positive. Will place order for colonoscopy.

## 2022-04-21 NOTE — TELEPHONE ENCOUNTER
Can you forward message to me once you have discussed results with pt and I will call to schedule colonoscopy.   Thanks

## 2022-04-21 NOTE — TELEPHONE ENCOUNTER
Called [facility] and spoke to Kelly Garcia and she stated the office was busy and she would contact me back to schedule patient

## 2022-04-21 NOTE — TELEPHONE ENCOUNTER
----- Message from Alida Ricketts sent at 4/21/2022 10:03 AM EDT -----  Subject: Message to Provider    QUESTIONS  Information for Provider? URGENT? Freda Ames [facility]   want to know if office received the fax regarding patient colon results   case number [id number] If any question please call [phone]   ---------------------------------------------------------------------------  --------------  CALL BACK INFO  What is the best way for the office to contact you? OK to leave message on   voicemail  Preferred Call Back Phone Number? [phone]  ---------------------------------------------------------------------------  --------------  SCRIPT ANSWERS  Relationship to Patient? Third Party  Third Party Type? Pharmacy?    Representative Name? [employer]

## 2022-04-22 NOTE — TELEPHONE ENCOUNTER
Scheduled NP appt on 5/4 with Brook Macias. Office stated patient required NP appt prior to scheduling a colonoscopy.   Spoke to wife Edith Serra (on HIPAA) and provided her with appt date and time

## 2022-06-22 DIAGNOSIS — E10.42 TYPE 1 DIABETES MELLITUS WITH DIABETIC POLYNEUROPATHY (HCC): ICD-10-CM

## 2022-06-22 RX ORDER — INSULIN DEGLUDEC INJECTION 100 U/ML
INJECTION, SOLUTION SUBCUTANEOUS
Qty: 12 PEN | Refills: 1 | Status: SHIPPED | OUTPATIENT
Start: 2022-06-22 | End: 2022-08-02 | Stop reason: SDUPTHER

## 2022-06-22 NOTE — TELEPHONE ENCOUNTER
Future Appointments:  Future Appointments   Date Time Provider Abdelrahman Meraz   8/19/2022  9:30 AM Papo Gallegos [facility] BS AMB        Last Appointment With Me:  1/13/2022     Requested Prescriptions     Pending Prescriptions Disp Refills    insulin degludec Hillary Niecy FlexTouch U-100) 100 unit/mL (3 mL) inpn 12 Pen 1     Sig: INJECT 40 UNITS UNDER THE SKIN ONCE DAILY

## 2022-07-07 ENCOUNTER — HOSPITAL ENCOUNTER (EMERGENCY)
Age: 52
Discharge: HOME | End: 2022-07-07
Attending: EMERGENCY MEDICINE

## 2022-07-07 ENCOUNTER — APPOINTMENT (OUTPATIENT)
Dept: CT IMAGING | Age: 52
End: 2022-07-07
Attending: EMERGENCY MEDICINE

## 2022-07-07 VITALS
OXYGEN SATURATION: 98 % | SYSTOLIC BLOOD PRESSURE: 154 MMHG | RESPIRATION RATE: 16 BRPM | TEMPERATURE: 97.8 F | DIASTOLIC BLOOD PRESSURE: 84 MMHG | HEART RATE: 77 BPM

## 2022-07-07 DIAGNOSIS — R55 SYNCOPE AND COLLAPSE: ICD-10-CM

## 2022-07-07 DIAGNOSIS — S09.90XA TRAUMATIC INJURY OF HEAD, INITIAL ENCOUNTER: Primary | ICD-10-CM

## 2022-07-07 LAB
ALBUMIN SERPL-MCNC: 3.2 G/DL (ref 3.5–5)
ALBUMIN/GLOB SERPL: 0.9 {RATIO} (ref 1.1–2.2)
ALP SERPL-CCNC: 88 U/L (ref 45–117)
ALT SERPL-CCNC: 26 U/L (ref 12–78)
ANION GAP SERPL CALC-SCNC: 4 MMOL/L (ref 5–15)
APPEARANCE UR: CLEAR
AST SERPL-CCNC: 16 U/L (ref 15–37)
BACTERIA URNS QL MICRO: NEGATIVE /HPF
BASOPHILS # BLD: 0.1 K/UL (ref 0–0.1)
BASOPHILS NFR BLD: 1 % (ref 0–1)
BILIRUB SERPL-MCNC: 0.5 MG/DL (ref 0.2–1)
BILIRUB UR QL: NEGATIVE
BUN SERPL-MCNC: 15 MG/DL (ref 6–20)
BUN/CREAT SERPL: 16 (ref 12–20)
CALCIUM SERPL-MCNC: 9.5 MG/DL (ref 8.5–10.1)
CHLORIDE SERPL-SCNC: 101 MMOL/L (ref 97–108)
CO2 SERPL-SCNC: 29 MMOL/L (ref 21–32)
COLOR UR: ABNORMAL
COMMENT, HOLDF: NORMAL
CREAT SERPL-MCNC: 0.93 MG/DL (ref 0.7–1.3)
DIFFERENTIAL METHOD BLD: ABNORMAL
EOSINOPHIL # BLD: 0.1 K/UL (ref 0–0.4)
EOSINOPHIL NFR BLD: 1 % (ref 0–7)
EPITH CASTS URNS QL MICRO: ABNORMAL /LPF
ERYTHROCYTE [DISTWIDTH] IN BLOOD BY AUTOMATED COUNT: 13.2 % (ref 11.5–14.5)
GLOBULIN SER CALC-MCNC: 3.4 G/DL (ref 2–4)
GLUCOSE SERPL-MCNC: 303 MG/DL (ref 65–100)
GLUCOSE UR STRIP.AUTO-MCNC: 500 MG/DL
HCT VFR BLD AUTO: 39.4 % (ref 36.6–50.3)
HGB BLD-MCNC: 13.2 G/DL (ref 12.1–17)
HGB UR QL STRIP: NEGATIVE
HYALINE CASTS URNS QL MICRO: ABNORMAL /LPF (ref 0–5)
IMM GRANULOCYTES # BLD AUTO: 0 K/UL (ref 0–0.04)
IMM GRANULOCYTES NFR BLD AUTO: 0 % (ref 0–0.5)
KETONES UR QL STRIP.AUTO: NEGATIVE MG/DL
LEUKOCYTE ESTERASE UR QL STRIP.AUTO: NEGATIVE
LYMPHOCYTES # BLD: 1.2 K/UL (ref 0.8–3.5)
LYMPHOCYTES NFR BLD: 15 % (ref 12–49)
MCH RBC QN AUTO: 27.1 PG (ref 26–34)
MCHC RBC AUTO-ENTMCNC: 33.5 G/DL (ref 30–36.5)
MCV RBC AUTO: 80.9 FL (ref 80–99)
MONOCYTES # BLD: 0.4 K/UL (ref 0–1)
MONOCYTES NFR BLD: 5 % (ref 5–13)
NEUTS SEG # BLD: 6.5 K/UL (ref 1.8–8)
NEUTS SEG NFR BLD: 78 % (ref 32–75)
NITRITE UR QL STRIP.AUTO: NEGATIVE
NRBC # BLD: 0 K/UL (ref 0–0.01)
NRBC BLD-RTO: 0 PER 100 WBC
PH UR STRIP: 7.5 [PH] (ref 5–8)
PLATELET # BLD AUTO: 235 K/UL (ref 150–400)
PMV BLD AUTO: 9 FL (ref 8.9–12.9)
POTASSIUM SERPL-SCNC: 4.1 MMOL/L (ref 3.5–5.1)
PROT SERPL-MCNC: 6.6 G/DL (ref 6.4–8.2)
PROT UR STRIP-MCNC: NEGATIVE MG/DL
RBC # BLD AUTO: 4.87 M/UL (ref 4.1–5.7)
RBC #/AREA URNS HPF: ABNORMAL /HPF (ref 0–5)
SAMPLES BEING HELD,HOLD: NORMAL
SODIUM SERPL-SCNC: 134 MMOL/L (ref 136–145)
SP GR UR REFRACTOMETRY: 1.01 (ref 1–1.03)
UR CULT HOLD, URHOLD: NORMAL
UROBILINOGEN UR QL STRIP.AUTO: 1 EU/DL (ref 0.2–1)
WBC # BLD AUTO: 8.3 K/UL (ref 4.1–11.1)
WBC URNS QL MICRO: ABNORMAL /HPF (ref 0–4)

## 2022-07-07 PROCEDURE — 85025 COMPLETE CBC W/AUTO DIFF WBC: CPT

## 2022-07-07 PROCEDURE — 36415 COLL VENOUS BLD VENIPUNCTURE: CPT

## 2022-07-07 PROCEDURE — 70450 CT HEAD/BRAIN W/O DYE: CPT

## 2022-07-07 PROCEDURE — 99284 EMERGENCY DEPT VISIT MOD MDM: CPT

## 2022-07-07 PROCEDURE — 80053 COMPREHEN METABOLIC PANEL: CPT

## 2022-07-07 PROCEDURE — 93005 ELECTROCARDIOGRAM TRACING: CPT

## 2022-07-07 PROCEDURE — 81001 URINALYSIS AUTO W/SCOPE: CPT

## 2022-07-07 NOTE — ED PROVIDER NOTES
46 y.o. with history of DM and neuropathy presents with lightheadedness s/p head trauma. Patient reports that yesterday his blood sugar was low and he passed out in the bathroom, hitting his head on the shower. He notes that he remembered feeling lightheaded before and passing out. He denies LOC after the head trauma and remembers hitting his head. He did not measure his blood sugar but woke up very sweaty, feeling like he typically does when he is hypoglycemic. He went into the bathroom to get some glucose tablets and this is when he passed out. Now he is having head tenderness to the touch and lightheaded. He also notes some vision blurriness. He denies HA, numbness, tingling, N/V/D, fevers, chills, CP, SOB. The history is provided by the patient. Head Injury   Pertinent negatives include no vomiting.         Past Medical History:   Diagnosis Date    Diabetes ([facility] [address]     Neuropathy        Past Surgical History:   Procedure Laterality Date    HX BACK SURGERY      L5    HX OTHER SURGICAL Bilateral     knee surgery    HX OTHER SURGICAL      thumb surgery for tendon repair         Family History:   Problem Relation Age of Onset    Diabetes Mother     Cancer Father     Diabetes Maternal Aunt        Social History     Socioeconomic History    Marital status:      Spouse name: Not on file    Number of children: Not on file    Years of education: Not on file    Highest education level: Not on file   Occupational History    Not on file   Tobacco Use    Smoking status: Never Smoker    Smokeless tobacco: Never Used   Substance and Sexual Activity    Alcohol use: Never    Drug use: Never    Sexual activity: Not on file   Other Topics Concern    Not on file   Social History Narrative    Not on file     Social Determinants of Health     Financial Resource Strain:     Difficulty of Paying Living Expenses: Not on file   Food Insecurity:     Worried About [address] in the Last Year: Not on file    [address] in the Last Year: Not on file   Transportation Needs:     Lack of Transportation (Medical): Not on file    Lack of Transportation (Non-Medical): Not on file   Physical Activity:     Days of Exercise per Week: Not on file    Minutes of Exercise per Session: Not on file   Stress:     Feeling of Stress : Not on file   Social Connections:     Frequency of Communication with Friends and Family: Not on file    Frequency of Social Gatherings with Friends and Family: Not on file    Attends Yarsanism Services: Not on file    Active Member of [address] [place] [address] [email or web address] or Organizations: Not on file    Attends Club or Organization Meetings: Not on file    Marital Status: Not on file   Intimate Partner Violence:     Fear of Current or Ex-Partner: Not on file    Emotionally Abused: Not on file    Physically Abused: Not on file    Sexually Abused: Not on file   Housing Stability:     Unable to Pay for Housing in the Last Year: Not on file    Number of [place] in the Last Year: Not on file    Unstable Housing in the Last Year: Not on file         ALLERGIES: Patient has no known allergies. Review of Systems   Constitutional: Negative for fever. HENT: Negative for congestion and sinus pain. Respiratory: Negative for cough. Cardiovascular: Negative for chest pain. Gastrointestinal: Negative for nausea and vomiting. Genitourinary: Negative for dysuria. Musculoskeletal: Negative for myalgias. Neurological: Positive for light-headedness. Negative for headaches. Hematological: Negative for adenopathy. All other systems reviewed and are negative. Vitals:    07/07/22 1309   BP: 134/83   Pulse: 89   Resp: 16   Temp: 97.8 °F (36.6 °C)   SpO2: 97%            Physical Exam  Vitals and nursing note reviewed. Constitutional:       Appearance: Normal appearance. HENT:      Head: Contusion (goose egg contusion to R parietal area) present.    Eyes:      Extraocular Movements: Extraocular movements intact. Pupils: Pupils are equal, round, and reactive to light. Cardiovascular:      Rate and Rhythm: Normal rate and regular rhythm. Heart sounds: Normal heart sounds. Pulmonary:      Effort: Pulmonary effort is normal.      Breath sounds: Normal breath sounds. Abdominal:      Palpations: Abdomen is soft. Tenderness: There is no abdominal tenderness. Lymphadenopathy:      Cervical: No cervical adenopathy. Skin:     General: Skin is warm and dry. Neurological:      General: No focal deficit present. Mental Status: He is alert and oriented to person, place, and time. Cranial Nerves: No cranial nerve deficit. Psychiatric:         Mood and Affect: Mood normal.         Behavior: Behavior normal.          MDM  Number of Diagnoses or Management Options  Syncope and collapse  Traumatic injury of head, initial encounter  Diagnosis management comments: 46 y.o. with history of DM and neuropathy presents with lightheadedness s/p head trauma. Vital signs stable in triage and orthostatics unremarkable as outlined in chart. See below for EKG interpretation. Physical exam unremarkable aside from contusion noted to right anterior aspect of head. Labs and urine unremarkable aside from elevated glucose which patient reports he has already scheduled an appointment to talk about with his PCP. CT of head showed no acute intracranial abnormality. Patient will be discharged with instructions for conservative care, follow-up and return precautions.     ED Course as of 07/07/22 1719   Thu Jul 07, 2022   1337 EKG shows sinus rhythm at a rate of 84, normal intervals, normal axis, no ischemic changes [RD]      ED Course User Index  [RD] Yesenia Celaya MD       Procedures

## 2022-07-10 LAB
ATRIAL RATE: 84 BPM
CALCULATED P AXIS, ECG09: 66 DEGREES
CALCULATED R AXIS, ECG10: 14 DEGREES
CALCULATED T AXIS, ECG11: 72 DEGREES
DIAGNOSIS, 93000: NORMAL
P-R INTERVAL, ECG05: 170 MS
Q-T INTERVAL, ECG07: 370 MS
QRS DURATION, ECG06: 102 MS
QTC CALCULATION (BEZET), ECG08: 437 MS
VENTRICULAR RATE, ECG03: 84 BPM

## 2022-07-19 DIAGNOSIS — E10.42 TYPE 1 DIABETES MELLITUS WITH DIABETIC POLYNEUROPATHY (HCC): ICD-10-CM

## 2022-07-19 NOTE — TELEPHONE ENCOUNTER
Future Appointments:  Future Appointments   Date Time Provider Abdelrahman Meraz   2022  9:30 AM Chivo Dela Cruz, DO [facility] BS AMB        Last Appointment With Me:  Visit date not found     Requested Prescriptions     Pending Prescriptions Disp Refills    insulin lispro (HumaLOG KwikPen Insulin) 100 unit/mL kwikpen 4 Pen 2     Si Units by SubCUTAneous route Before breakfast, lunch, and dinner.

## 2022-07-20 RX ORDER — INSULIN LISPRO 100 [IU]/ML
12 INJECTION, SOLUTION INTRAVENOUS; SUBCUTANEOUS
Qty: 4 PEN | Refills: 0 | Status: SHIPPED | OUTPATIENT
Start: 2022-07-20 | End: 2022-08-02 | Stop reason: SDUPTHER

## 2022-08-02 ENCOUNTER — TELEPHONE (OUTPATIENT)
Dept: INTERNAL MEDICINE CLINIC | Age: 52
End: 2022-08-02

## 2022-08-02 DIAGNOSIS — E10.42 TYPE 1 DIABETES MELLITUS WITH DIABETIC POLYNEUROPATHY (HCC): ICD-10-CM

## 2022-08-02 RX ORDER — INSULIN ASPART 100 [IU]/ML
12 INJECTION, SOLUTION INTRAVENOUS; SUBCUTANEOUS
Qty: 6 PEN | Refills: 1 | Status: SHIPPED | OUTPATIENT
Start: 2022-08-02 | End: 2022-10-31

## 2022-08-02 RX ORDER — INSULIN LISPRO 100 [IU]/ML
12 INJECTION, SOLUTION INTRAVENOUS; SUBCUTANEOUS
Qty: 4 PEN | Refills: 0 | Status: CANCELLED | OUTPATIENT
Start: 2022-08-02

## 2022-08-02 RX ORDER — GABAPENTIN 800 MG/1
TABLET ORAL
Qty: 90 TABLET | Refills: 1 | Status: SHIPPED | OUTPATIENT
Start: 2022-08-02 | End: 2022-10-05 | Stop reason: SDUPTHER

## 2022-08-02 RX ORDER — INSULIN DEGLUDEC INJECTION 100 U/ML
INJECTION, SOLUTION SUBCUTANEOUS
Qty: 12 PEN | Refills: 1 | Status: SHIPPED | OUTPATIENT
Start: 2022-08-02 | End: 2022-10-31 | Stop reason: SDUPTHER

## 2022-08-02 NOTE — TELEPHONE ENCOUNTER
Pt states his pharmacy has tried to get scripts for him for two weeks. Pt states he is out of state due to his father passing. He will get a new doctor, but will need 2-3 months of medication in the meantime. Pt was about to give medications when the phone went dead. Could not reach pt back. Please call for medications needed. Thanks.

## 2022-08-02 NOTE — TELEPHONE ENCOUNTER
Called, spoke to pt. Two pt identifiers confirmed. Patient informed prescription has been sent to the pharmacy. Pt verbalized understanding of information discussed w/ no further questions at this time.

## 2022-08-02 NOTE — TELEPHONE ENCOUNTER
Called patient. ID verified with Name and . Patient informed prescriptions has been sent to Dr. Aida Hernandez for approval.    Patient states the Humalog is no longer covered. Patient is requesting medication Novolog in its place. Patient verbalized understanding of information discussed w/ no further questions at this time.      Domenic Shaffer, JENNIFERN      Future Appointments:  Future Appointments   Date Time Provider Abdelrahman Mariya   2022  9:30 AM [place], DO [facility] BS AMB        Last Appointment With Me:  Visit date not found     Requested Prescriptions     Pending Prescriptions Disp Refills    insulin degludec Jake Money FlexTouch U-100) 100 unit/mL (3 mL) inpn 12 Pen 1     Sig: INJECT 40 UNITS UNDER THE SKIN ONCE DAILY    gabapentin (NEURONTIN) 800 mg tablet 90 Tablet 5     Sig: TK 1 T PO TID    insulin aspart U-100 (NOVOLOG) 100 unit/mL (3 mL) inpn

## 2022-08-02 NOTE — TELEPHONE ENCOUNTER
Called patient. ID verified with Name and . Patient informed prescriptions has been sent to Dr. Marija Aguilar for approval.     Patient states the Humalog is no longer covered. Patient is requesting medication Novolog in its place. Patient verbalized understanding of information discussed w/ no further questions at this time.      Samantha Krishna LPN

## 2022-08-02 NOTE — TELEPHONE ENCOUNTER
Patient states he needs a call back Asap in reference to Insulin Prescribed, insulin lispro (HumaLOG KwikPen Insulin) 100 unit/mL kwikpen, is not covered by his Insurance & patient is out of Medication. Please call to discuss & advise.  Thank you

## 2022-08-02 NOTE — TELEPHONE ENCOUNTER
Pt called back and these are the three medications that will be needed until he finds a new physician.

## 2022-10-04 ENCOUNTER — TELEPHONE (OUTPATIENT)
Dept: INTERNAL MEDICINE CLINIC | Age: 52
End: 2022-10-04

## 2022-10-05 DIAGNOSIS — E10.42 TYPE 1 DIABETES MELLITUS WITH DIABETIC POLYNEUROPATHY (HCC): ICD-10-CM

## 2022-10-05 NOTE — TELEPHONE ENCOUNTER
Future Appointments:  No future appointments.      Last Appointment With Me:  Visit date not found     Requested Prescriptions     Pending Prescriptions Disp Refills    gabapentin (NEURONTIN) 800 mg tablet 90 Tablet 1     Sig: TK 1 T PO TID

## 2022-10-06 RX ORDER — GABAPENTIN 800 MG/1
TABLET ORAL
Qty: 90 TABLET | Refills: 0 | Status: SHIPPED | OUTPATIENT
Start: 2022-10-06 | End: 2022-10-31 | Stop reason: SDUPTHER

## 2022-10-31 DIAGNOSIS — E10.42 TYPE 1 DIABETES MELLITUS WITH DIABETIC POLYNEUROPATHY (HCC): ICD-10-CM

## 2022-10-31 RX ORDER — GABAPENTIN 800 MG/1
TABLET ORAL
Qty: 90 TABLET | Refills: 0 | Status: SHIPPED | OUTPATIENT
Start: 2022-10-31

## 2022-10-31 RX ORDER — INSULIN DEGLUDEC 100 U/ML
INJECTION, SOLUTION SUBCUTANEOUS
Qty: 12 PEN | Refills: 1 | Status: SHIPPED | OUTPATIENT
Start: 2022-10-31 | End: 2022-11-03 | Stop reason: SDUPTHER

## 2022-10-31 NOTE — TELEPHONE ENCOUNTER
Wife, Jay Jay Nayak states that pt is out of the insulin and needs yet today. [facility] was to have requested on 10-27-22. I advised they may call the office for refills and we will need a 72 hour turn around. If there is a problem filling this today, please call pt right away. Thanks.

## 2022-10-31 NOTE — TELEPHONE ENCOUNTER
Future Appointments:  No future appointments.      Last Appointment With Me:  Visit date not found     Requested Prescriptions     Pending Prescriptions Disp Refills    insulin degludec Rosalinda Selby FlexTouch U-100) 100 unit/mL (3 mL) inpn 12 Pen 1     Sig: INJECT 40 UNITS UNDER THE SKIN ONCE DAILY    gabapentin (NEURONTIN) 800 mg tablet 90 Tablet 0     Sig: TK 1 T PO TID

## 2022-11-03 DIAGNOSIS — E10.42 TYPE 1 DIABETES MELLITUS WITH DIABETIC POLYNEUROPATHY (HCC): ICD-10-CM

## 2022-11-03 NOTE — TELEPHONE ENCOUNTER
Wife states that there needs to be a 80 day script called in for this medication as is is so much cheaper that way. Can this be called into [facility] on file? Thanks.

## 2022-11-03 NOTE — TELEPHONE ENCOUNTER
Future Appointments:  No future appointments.  LOV 01/2022 - scheduled VV for 11/7/22    Last Appointment With Me:  1/13/22    Requested Prescriptions     Pending Prescriptions Disp Refills    insulin degludec Radha Golas FlexTouch U-100) 100 unit/mL (3 mL) inpn 36 Pen 1     Sig: INJECT 40 UNITS UNDER THE SKIN ONCE DAILY

## 2022-11-04 ENCOUNTER — TELEPHONE (OUTPATIENT)
Dept: INTERNAL MEDICINE CLINIC | Age: 52
End: 2022-11-04

## 2022-11-04 RX ORDER — INSULIN DEGLUDEC 100 U/ML
INJECTION, SOLUTION SUBCUTANEOUS
Qty: 36 PEN | Refills: 1 | Status: SHIPPED | OUTPATIENT
Start: 2022-11-04

## 2022-11-07 ENCOUNTER — VIRTUAL VISIT (OUTPATIENT)
Dept: INTERNAL MEDICINE CLINIC | Age: 52
End: 2022-11-07
Payer: COMMERCIAL

## 2022-11-07 DIAGNOSIS — Z12.5 SCREENING FOR MALIGNANT NEOPLASM OF PROSTATE: Primary | ICD-10-CM

## 2022-11-07 DIAGNOSIS — E10.42 TYPE 1 DIABETES MELLITUS WITH DIABETIC POLYNEUROPATHY (HCC): ICD-10-CM

## 2022-11-07 PROCEDURE — 99214 OFFICE O/P EST MOD 30 MIN: CPT | Performed by: INTERNAL MEDICINE

## 2022-11-07 RX ORDER — FLASH GLUCOSE SENSOR
1 KIT MISCELLANEOUS EVERY 2 WEEKS
Qty: 1 KIT | Refills: 5 | Status: SHIPPED | OUTPATIENT
Start: 2022-11-07

## 2022-11-07 RX ORDER — INSULIN ASPART 100 [IU]/ML
INJECTION, SOLUTION INTRAVENOUS; SUBCUTANEOUS
Qty: 5 PEN | Refills: 2 | Status: SHIPPED | OUTPATIENT
Start: 2022-11-07

## 2022-11-07 RX ORDER — FLASH GLUCOSE SENSOR
1 KIT MISCELLANEOUS
Qty: 1 KIT | Refills: 1 | Status: CANCELLED | OUTPATIENT
Start: 2022-11-07

## 2022-11-07 RX ORDER — FLASH GLUCOSE SCANNING READER
1 EACH MISCELLANEOUS DAILY
Qty: 1 EACH | Refills: 1 | Status: SHIPPED | OUTPATIENT
Start: 2022-11-07

## 2022-11-07 RX ORDER — INSULIN ASPART 100 [IU]/ML
INJECTION, SOLUTION INTRAVENOUS; SUBCUTANEOUS
COMMUNITY
End: 2022-11-07 | Stop reason: SDUPTHER

## 2022-11-07 RX ORDER — FLASH GLUCOSE SCANNING READER
1 EACH MISCELLANEOUS
Qty: 1 EACH | Refills: 2 | Status: CANCELLED | OUTPATIENT
Start: 2022-11-07

## 2022-11-07 NOTE — PROGRESS NOTES
1. \"Have you been to the ER, urgent care clinic since your last visit? Hospitalized since your last visit? \" No    2. \"Have you seen or consulted any other health care providers outside of the [address] [place] [address] since your last visit? \" No     3. For patients aged 39-70: Has the patient had a colonoscopy / FIT/ Cologuard? Yes - no Care Gap present      If the patient is female:    4. For patients aged 41-77: Has the patient had a mammogram within the past 2 years? NA - based on age or sex      11. For patients aged 21-65: Has the patient had a pap smear?  NA - based on age or sex    BG this morning-135

## 2022-11-07 NOTE — PROGRESS NOTES
CC: Follow-up, Diabetes, and Medication Refill      HPI:    He is a 46 y.o. male who presents for evaluation of        He was  last seen  Approximately one year ago         Patient has type 1 diabetes  Checking sugar with [facility] meter needs continuous glucose meter   Reports  blood sugars are mainly 130-200    morning 135   Had one very low which was 35 and had to call ambulance      Currently taking 40 units of tresiba  Short acting patient takes between 8 -12 units with meals -   Breakfast typically waffles  Lunch sandwich   Dinner is a full meal with meat and veggies and starch  Drinking mostly water    Taking gabapentin for neuropathy  Overdue for eye exam reminded today  Last HA1C was jan 2022 11.4            This is an established visit conducted via telemedicine with video. The patient has been instructed that this meets HIPAA criteria and acknowledges and agrees to this method of visitation. Pursuant to the emergency declaration under the [facility], [facility]5 waiver authority and the [employer] Act, this Virtual Visit was conducted, with patient's consent, to reduce the patient's risk of exposure to COVID-19 and provide continuity of care for an established patient. Services were provided through a video synchronous discussion virtually to substitute for in-person clinic visit.        ROS:  Constitutional: negative for fevers, chills, anorexia and weight loss  10 systems reviewed and negative other then HPI     Past Medical History:   Diagnosis Date    Diabetes ([address]     Neuropathy        Current Outpatient Medications on File Prior to Visit   Medication Sig Dispense Refill    insulin degludec Honora Alpesh FlexTouch U-100) 100 unit/mL (3 mL) inpn INJECT 40 UNITS UNDER THE SKIN ONCE DAILY 36 Pen 1    gabapentin (NEURONTIN) 800 mg tablet TK 1 T PO TID 90 Tablet 0    flash glucose scanning reader (FreeStyle Josue 14 Day [place]) misc 1 Each by Does Not Apply route every fourteen (14) days. 1 Each 2    Insulin Needles, Disposable, 31 gauge x 5/16\" ndle by SubCUTAneous route four (4) times daily (with meals). 1 Package 11    Insulin Needles, Disposable, 31 gauge x 5/16\" ndle  100 units 1 Package 11    flash glucose sensor (FREESTYLE [place] 14 DAY SENSOR) kit 1 Each by Does Not Apply route Before breakfast, lunch, and dinner. 1 Kit 1    ondansetron (ZOFRAN ODT) 4 mg disintegrating tablet Take 1 Tab by mouth every eight (8) hours as needed for Nausea. 10 Tab 0    flash glucose sensor (FreeStyle [place] 14 Day Sensor) kit 1 Each by Does Not Apply route Once every 2 weeks. 2 Kit 4     No current facility-administered medications on file prior to visit. Past Surgical History:   Procedure Laterality Date    HX BACK SURGERY      L5    HX OTHER SURGICAL Bilateral     knee surgery    HX OTHER SURGICAL      thumb surgery for tendon repair       Family History   Problem Relation Age of Onset    Diabetes Mother     Cancer Father     Diabetes Maternal Aunt      Reviewed and no changes     Social History     Socioeconomic History    Marital status:      Spouse name: Not on file    Number of children: Not on file    Years of education: Not on file    Highest education level: Not on file   Occupational History    Not on file   Tobacco Use    Smoking status: Never    Smokeless tobacco: Never   Substance and Sexual Activity    Alcohol use: Never    Drug use: Never    Sexual activity: Not on file   Other Topics Concern    Not on file   Social History Narrative    Not on file     Social Determinants of Health     Financial Resource Strain: Not on file   Food Insecurity: Not on file   Transportation Needs: Not on file   Physical Activity: Not on file   Stress: Not on file   Social Connections: Not on file   Intimate Partner Violence: Not on file   Housing Stability: Not on file          There were no vitals taken for this visit.     Physical Examination:   Gen: well appearing male  HEENT: normal conjunctiva, no audible congestion, patient does not see oral erythema, has MMM  Neck: patient does not feel enlarged or tender LAD or masses  Resp: normal respiratory effort, no audible wheezing. CV: patient does not feel palpitations or heart irregularity  Abd: patient does not feel abdominal tenderness or mass, patient does not notice distension  Extrem: patient does not see swelling in ankles or joints.    Neuro: Alert and oriented, able to answer questions without difficulty, able to move all extremities and walk normally          Lab Results   Component Value Date/Time    WBC 8.3 07/07/2022 01:48 PM    HGB 13.2 07/07/2022 01:48 PM    HCT 39.4 07/07/2022 01:48 PM    PLATELET 203 13/46/8363 01:48 PM    MCV 80.9 07/07/2022 01:48 PM     Lab Results   Component Value Date/Time    Sodium 134 (L) 07/07/2022 01:48 PM    Potassium 4.1 07/07/2022 01:48 PM    Chloride 101 07/07/2022 01:48 PM    CO2 29 07/07/2022 01:48 PM    Anion gap 4 (L) 07/07/2022 01:48 PM    Glucose 303 (H) 07/07/2022 01:48 PM    BUN 15 07/07/2022 01:48 PM    Creatinine 0.93 07/07/2022 01:48 PM    BUN/Creatinine ratio 16 07/07/2022 01:48 PM    GFR est AA >60 07/07/2022 01:48 PM    GFR est non-AA >60 07/07/2022 01:48 PM    Calcium 9.5 07/07/2022 01:48 PM     Lab Results   Component Value Date/Time    Cholesterol, total 147 01/14/2022 10:18 AM    HDL Cholesterol 52 01/14/2022 10:18 AM    LDL, calculated 70.8 01/14/2022 10:18 AM    VLDL, calculated 24.2 01/14/2022 10:18 AM    Triglyceride 121 01/14/2022 10:18 AM    CHOL/HDL Ratio 2.8 01/14/2022 10:18 AM     No results found for: TSH, TSH2, TSH3, TSHP, TSHEXT, TSHEXT  Lab Results   Component Value Date/Time    Prostate Specific Ag 0.3 01/14/2022 10:18 AM     Lab Results   Component Value Date/Time    Hemoglobin A1c 11.4 (H) 01/14/2022 10:18 AM     No results found for: VITD3, Deboraha Poles, VD3RIA    Lab Results   Component Value Date/Time ALT (SGPT) 26 07/07/2022 01:48 PM    Alk. phosphatase 88 07/07/2022 01:48 PM    Bilirubin, total 0.5 07/07/2022 01:48 PM           Assessment/Plan:    1. Type 1 diabetes mellitus with diabetic polyneuropathy (HCC)  Uncontrolled, patient is not following up every 3 months as advised  He reports some lows in the AM therefore will lower long acting to 38 units and continue 10 units with meals   Discussed using continuous glucose monitoring to better monitor blood sugar   - insulin aspart U-100 (NovoLOG Flexpen U-100 Insulin) 100 unit/mL (3 mL) inpn; 10-12 units  Dispense: 5 Pen; Refill: 2  - flash glucose scanning reader (FreeStyle Josue 2 [place]) misc; 1 Each by Does Not Apply route daily. Dispense: 1 Each; Refill: 1  - flash glucose sensor (FreeStyle Josue 2 Sensor) kit; 1 Each by Does Not Apply route Once every 2 weeks. Dispense: 1 Kit; Refill: 5  - CBC WITH AUTOMATED DIFF; Future  - METABOLIC PANEL, COMPREHENSIVE; Future  - HEMOGLOBIN A1C WITH EAG; Future  - LIPID PANEL; Future  - MICROALBUMIN, UR, RAND W/ MICROALB/CREAT RATIO; Future    2. Screening for malignant neoplasm of prostate  - PSA W/ REFLX FREE PSA; Future        Follow-up and Dispositions    Return in about 3 months (around 2/7/2023) for in person type 1 diabetes . Naoma Mortimer, MD    This is an established visit conducted via real time video and audio telemedicine. The patient has been instructed that this meets HIPAA criteria and acknowledges and agrees to this method of visitation.

## 2023-02-20 ENCOUNTER — TELEPHONE (OUTPATIENT)
Dept: INTERNAL MEDICINE CLINIC | Age: 53
End: 2023-02-20

## 2023-02-20 NOTE — TELEPHONE ENCOUNTER
----- Message from Sarahnazario Contreras sent at 2/17/2023  4:41 PM EST -----  Subject: Message to Provider    QUESTIONS  Information for Provider? Pt's wife call to state that they have moved   back home to [place] and did not realize pt had an appt on 2/10 and they   apologize for forgetting to notify the practice of this move. Please note  ---------------------------------------------------------------------------  --------------  Wilber SAM  [phone]; OK to leave message on voicemail  ---------------------------------------------------------------------------  --------------  SCRIPT ANSWERS  Relationship to Patient?  Self

## 2023-10-06 ENCOUNTER — TELEPHONE (OUTPATIENT)
Age: 53
End: 2023-10-06

## (undated) DEVICE — (D)PREP SKN CHLRAPRP APPL 26ML -- CONVERT TO ITEM 371833

## (undated) DEVICE — SPONGE GZ W4XL4IN COT 12 PLY TYP VII WVN C FLD DSGN

## (undated) DEVICE — BANDAGE COMPR 9 FTX4 IN SMOOTH COMFORTABLE SYNTH ESMRK LF

## (undated) DEVICE — DRAPE,HAND,STERILE: Brand: MEDLINE

## (undated) DEVICE — Z DISCONTINUED GLOVE SURG SZ 7.5 L12IN FNGR THK13MIL WHT ISOLEX

## (undated) DEVICE — HANDLE LT SNAP ON ULT DURABLE LENS FOR TRUMPF ALC DISPOSABLE

## (undated) DEVICE — SUT ETHLN 4-0 18IN PS2 BLK --

## (undated) DEVICE — SUTURE ETHBND EXCEL SZ 3-0 L30IN NONABSORBABLE GRN V-5 X916H

## (undated) DEVICE — SUTURE ETHLN SZ 8-0 L12IN NONABSORBABLE BLK L7MM TG175-8 1716G

## (undated) DEVICE — DRAPE,REIN 53X77,STERILE: Brand: MEDLINE

## (undated) DEVICE — SOLUTION IV 1000ML 0.9% SOD CHL

## (undated) DEVICE — DRESSING,GAUZE,XEROFORM,CURAD,1"X8",ST: Brand: CURAD

## (undated) DEVICE — BIPOLAR FORCEPS CORD: Brand: VALLEYLAB

## (undated) DEVICE — PADDING CST 4IN STERILE --

## (undated) DEVICE — INFECTION CONTROL KIT SYS

## (undated) DEVICE — Device

## (undated) DEVICE — Z DISCONTINUED NO SUB IDED BUCKET CAST INF CLAV STRP WHT BCKL CLSR PREM DISPOSABLE